# Patient Record
Sex: MALE | Race: BLACK OR AFRICAN AMERICAN | Employment: UNEMPLOYED | URBAN - METROPOLITAN AREA
[De-identification: names, ages, dates, MRNs, and addresses within clinical notes are randomized per-mention and may not be internally consistent; named-entity substitution may affect disease eponyms.]

---

## 2019-05-06 ENCOUNTER — OP HISTORICAL/CONVERTED ENCOUNTER (OUTPATIENT)
Dept: OTHER | Age: 49
End: 2019-05-06

## 2022-05-11 ENCOUNTER — HOSPITAL ENCOUNTER (OUTPATIENT)
Dept: GENERAL RADIOLOGY | Age: 52
Discharge: HOME | End: 2022-05-11
Payer: MEDICAID

## 2022-05-11 ENCOUNTER — TRANSCRIBE ORDER (OUTPATIENT)
Dept: REGISTRATION | Age: 52
End: 2022-05-11

## 2022-05-11 DIAGNOSIS — R05.9 COUGH: ICD-10-CM

## 2022-05-11 DIAGNOSIS — R05.9 COUGH: Primary | ICD-10-CM

## 2022-05-11 PROCEDURE — 71046 X-RAY EXAM CHEST 2 VIEWS: CPT

## 2022-07-12 ENCOUNTER — OFFICE VISIT (OUTPATIENT)
Dept: FAMILY MEDICINE CLINIC | Age: 52
End: 2022-07-12
Payer: MEDICARE

## 2022-07-12 VITALS
HEIGHT: 71 IN | OXYGEN SATURATION: 98 % | WEIGHT: 231.2 LBS | RESPIRATION RATE: 20 BRPM | BODY MASS INDEX: 32.37 KG/M2 | HEART RATE: 91 BPM | TEMPERATURE: 97.6 F | DIASTOLIC BLOOD PRESSURE: 82 MMHG | SYSTOLIC BLOOD PRESSURE: 126 MMHG

## 2022-07-12 DIAGNOSIS — E78.00 HYPERCHOLESTEROLEMIA: ICD-10-CM

## 2022-07-12 DIAGNOSIS — N48.1 BALANITIS: ICD-10-CM

## 2022-07-12 DIAGNOSIS — R35.0 INCREASED URINARY FREQUENCY: ICD-10-CM

## 2022-07-12 DIAGNOSIS — I10 ESSENTIAL HYPERTENSION: Primary | ICD-10-CM

## 2022-07-12 DIAGNOSIS — E11.9 TYPE 2 DIABETES MELLITUS WITHOUT COMPLICATION, WITHOUT LONG-TERM CURRENT USE OF INSULIN (HCC): ICD-10-CM

## 2022-07-12 DIAGNOSIS — E03.9 HYPOTHYROIDISM, UNSPECIFIED TYPE: ICD-10-CM

## 2022-07-12 PROBLEM — E66.9 OBESITY (BMI 30.0-34.9): Status: ACTIVE | Noted: 2022-07-12

## 2022-07-12 PROBLEM — E66.811 OBESITY (BMI 30.0-34.9): Status: ACTIVE | Noted: 2022-07-12

## 2022-07-12 PROBLEM — E55.9 VITAMIN D DEFICIENCY: Status: ACTIVE | Noted: 2022-07-12

## 2022-07-12 LAB
BILIRUB UR QL STRIP: NEGATIVE
GLUCOSE UR-MCNC: NORMAL MG/DL
KETONES P FAST UR STRIP-MCNC: NEGATIVE MG/DL
PH UR STRIP: 6 [PH] (ref 4.6–8)
PROT UR QL STRIP: NEGATIVE
SP GR UR STRIP: 1.03 (ref 1–1.03)
UA UROBILINOGEN AMB POC: NORMAL (ref 0.2–1)
URINALYSIS CLARITY POC: CLEAR
URINALYSIS COLOR POC: YELLOW
URINE BLOOD POC: NEGATIVE
URINE LEUKOCYTES POC: NEGATIVE
URINE NITRITES POC: NEGATIVE

## 2022-07-12 PROCEDURE — 81001 URINALYSIS AUTO W/SCOPE: CPT | Performed by: STUDENT IN AN ORGANIZED HEALTH CARE EDUCATION/TRAINING PROGRAM

## 2022-07-12 PROCEDURE — 99204 OFFICE O/P NEW MOD 45 MIN: CPT | Performed by: STUDENT IN AN ORGANIZED HEALTH CARE EDUCATION/TRAINING PROGRAM

## 2022-07-12 RX ORDER — INSULIN PUMP SYRINGE, 3 ML
EACH MISCELLANEOUS
Qty: 1 KIT | Refills: 0 | Status: SHIPPED | OUTPATIENT
Start: 2022-07-12

## 2022-07-12 RX ORDER — AMLODIPINE BESYLATE 10 MG/1
10 TABLET ORAL DAILY
COMMUNITY
End: 2022-10-17

## 2022-07-12 RX ORDER — CIPROFLOXACIN 500 MG/1
TABLET ORAL
COMMUNITY
Start: 2022-07-06 | End: 2022-07-12 | Stop reason: ALTCHOICE

## 2022-07-12 RX ORDER — ATORVASTATIN CALCIUM 20 MG/1
20 TABLET, FILM COATED ORAL DAILY
COMMUNITY
Start: 2022-06-15 | End: 2022-10-17

## 2022-07-12 RX ORDER — AMLODIPINE BESYLATE AND BENAZEPRIL HYDROCHLORIDE 10; 40 MG/1; MG/1
CAPSULE ORAL
COMMUNITY
Start: 2022-06-15 | End: 2022-07-12 | Stop reason: ALTCHOICE

## 2022-07-12 RX ORDER — LANCETS
EACH MISCELLANEOUS
Qty: 100 EACH | Refills: 2 | Status: SHIPPED | OUTPATIENT
Start: 2022-07-12

## 2022-07-12 RX ORDER — HYDRALAZINE HYDROCHLORIDE 50 MG/1
TABLET, FILM COATED ORAL
COMMUNITY
Start: 2022-06-22 | End: 2022-10-17

## 2022-07-12 RX ORDER — METRONIDAZOLE 500 MG/1
TABLET ORAL
COMMUNITY
Start: 2022-06-30 | End: 2022-07-12 | Stop reason: ALTCHOICE

## 2022-07-12 RX ORDER — ERGOCALCIFEROL 1.25 MG/1
50000 CAPSULE ORAL
COMMUNITY
Start: 2022-06-15 | End: 2022-10-17

## 2022-07-12 RX ORDER — SYRINGE-NEEDLE,INSULIN,0.5 ML 28GX1/2"
SYRINGE, EMPTY DISPOSABLE MISCELLANEOUS
COMMUNITY
Start: 2022-05-10 | End: 2022-07-12 | Stop reason: ALTCHOICE

## 2022-07-12 RX ORDER — CALCIUM CITRATE/VITAMIN D3 200MG-6.25
TABLET ORAL
COMMUNITY
Start: 2022-06-15 | End: 2022-07-12 | Stop reason: ALTCHOICE

## 2022-07-12 RX ORDER — FLUCONAZOLE 150 MG/1
150 TABLET ORAL DAILY
Qty: 1 TABLET | Refills: 0 | Status: SHIPPED | OUTPATIENT
Start: 2022-07-12 | End: 2022-07-13

## 2022-07-12 NOTE — PROGRESS NOTES
Patient stated name &     Chief Complaint   Patient presents with    New Patient     UTI     Frequent urination     Tip of penis raw and tender    Dr Jl Ivy - Former PCP dx'd uti      Had for 1 month   Tried Cipro 500 mg for 5 days        Health Maintenance Due   Topic    Hepatitis C Screening     Depression Screen     COVID-19 Vaccine (1)    DTaP/Tdap/Td series (1 - Tdap)    Lipid Screen     Colorectal Cancer Screening Combo     Shingrix Vaccine Age 50> (1 of 2)       Wt Readings from Last 3 Encounters:   22 231 lb 3.2 oz (104.9 kg)     Temp Readings from Last 3 Encounters:   22 97.6 °F (36.4 °C) (Temporal)     BP Readings from Last 3 Encounters:   22 (!) 181/97     Pulse Readings from Last 3 Encounters:   22 91         Learning Assessment:  :     No flowsheet data found. Depression Screening:  :     3 most recent PHQ Screens 2022   Little interest or pleasure in doing things Not at all   Feeling down, depressed, irritable, or hopeless Not at all   Total Score PHQ 2 0       Fall Risk Assessment:  :     No flowsheet data found. Abuse Screening:  :     Abuse Screening Questionnaire 2022   Do you ever feel afraid of your partner? N   Are you in a relationship with someone who physically or mentally threatens you? N   Is it safe for you to go home? Y       Coordination of Care Questionnaire:  :     1) Have you been to an emergency room, urgent care clinic since your last visit? no   Hospitalized since your last visit? no             2) Have you seen or consulted any other health care providers outside of [address] [place] [address] since your last visit? no  (Include any pap smears or colon screenings in this section.)    3) Do you have an Advance Directive on file?  no  Are you interested in receiving information about Advance Directives? no    Patient is accompanied by self I have received verbal consent from Laureen Clark to discuss any/all medical information while they are present in the room.

## 2022-07-12 NOTE — PROGRESS NOTES
Assessment/Plan:     Diagnoses and all orders for this visit:    1. Essential hypertension  -     MICROALBUMIN, UR, RAND W/ MICROALB/CREAT RATIO; Future  -     METABOLIC PANEL, COMPREHENSIVE; Future  -     CBC WITH AUTOMATED DIFF; Future  -Chronic. Currently controlled in the office with a BP of 126/82. -Will request records from previous PCP.  -Had a cough secondary to ACE inhibitor  -Will continue on hydralazine and amlodipine 10 mg daily  -If needed can consider adding losartan in the future    2. Type 2 diabetes mellitus without complication, without long-term current use of insulin ([facility])  -     MICROALBUMIN, UR, RAND W/ MICROALB/CREAT RATIO; Future  -     METABOLIC PANEL, COMPREHENSIVE; Future  -     CBC WITH AUTOMATED DIFF; Future  -     HEMOGLOBIN A1C WITH EAG; Future  -     Blood-Glucose Meter monitoring kit; Check fasting glucose daily. Can substitute brand covered by insurance  -     lancets misc; Check fasting glucose daily. Can substitute brand covered by insurance  -     glucose blood VI test strips (ASCENSIA AUTODISC VI, ONE TOUCH ULTRA TEST VI) strip; Check fasting glucose daily. Can substitute brand covered by insurance  -Chronic. Uncertain of the control.  -His increased urinary frequency may be due to uncontrolled diabetes therefore will check lab work today including an A1c  -Patient is up-to-date on his annual eye exams.  -Continue on glipizide 10 mg twice daily for now. Encouraged patient to check fasting glucoses and keep a log  -Encouraged dietary and lifestyle modifications  -Can consider adding a GLP-1 if needed pending lab results  -Follow-up in 1 month for reevaluation    3. Hypothyroidism, unspecified type  -     TSH 3RD GENERATION; Future  -Previous history of hypothyroidism but has not required levothyroxine for couple of years per patient  -Will repeat a TSH today. 4. Increased urinary frequency  -     AMB POC URINALYSIS DIP STICK AUTO W/ MICRO  -     CULTURE, URINE;  Future  -Has had increased urinary frequency and previously treated for UTI. -UA in office today unremarkable other than 2+ glucose. Suspect his diabetes may be uncontrolled and contributing.  -Will send urine for culture to rule out infection. 5. Balanitis  -     fluconazole (DIFLUCAN) 150 mg tablet; Take 1 Tablet by mouth daily for 1 day. FDA advises cautious prescribing of oral fluconazole in pregnancy.  -Physical exam findings consistent with balanitis due to yeast infection  -Likely secondary to underlying diabetes and recent antibiotic use  -Given extent of the yeast infection will prescribe one-time dose fluconazole  -Patient to follow-up should symptoms fail to improve. Could consider a topical antifungal if needed. 6. Hypercholesterolemia  -     LIPID PANEL; Future  -Chronic. Will check a lipid panel today.  -Continue on atorvastatin daily. Follow-up and Dispositions    · Return in about 4 weeks (around 8/9/2022), or if symptoms worsen or fail to improve. Discussed expected course/resolution/complications of diagnosis in detail with patient. Medication risks/benefits/costs/interactions/alternatives discussed with patient. Pt expressed understanding with the diagnosis and plan      Subjective:      Keri Fields is a 45 y.o. male who presents for had concerns including New Patient (UTI     Frequent urination     Tip of penis raw and tender    Dr Bennie Fischer - Former PCP dx'd uti      Had for 1 month   Tried Cipro 500 mg for 5 days). Current Outpatient Medications   Medication Sig Dispense Refill    atorvastatin (LIPITOR) 20 mg tablet Take 20 mg by mouth daily.  ergocalciferol (ERGOCALCIFEROL) 1,250 mcg (50,000 unit) capsule Take 50,000 Units by mouth every seven (7) days. TAKES EVERY MONDAY      Blood-Glucose Meter monitoring kit Check fasting glucose daily. Can substitute brand covered by insurance 1 Kit 0    lancets misc Check fasting glucose daily.  Can substitute brand covered by insurance 100 Each 2    glucose blood VI test strips (ASCENSIA AUTODISC VI, ONE TOUCH ULTRA TEST VI) strip Check fasting glucose daily. Can substitute brand covered by insurance 100 Strip 2    fluconazole (DIFLUCAN) 150 mg tablet Take 1 Tablet by mouth daily for 1 day. FDA advises cautious prescribing of oral fluconazole in pregnancy. 1 Tablet 0    amLODIPine (NORVASC) 10 mg tablet Take 10 mg by mouth daily.  glipiZIDE (GLUCOTROL) 10 mg tablet Take 10 mg by mouth two (2) times a day.  hydrALAZINE (APRESOLINE) 50 mg tablet  (Patient not taking: Reported on 7/12/2022)         No Known Allergies  Past Medical History:   Diagnosis Date    Diabetes ([facility] [address]     Hypercholesterolemia     Hypertension      History reviewed. No pertinent surgical history. Family History   Problem Relation Age of Onset    Diabetes Father      Social History     Socioeconomic History    Marital status: SINGLE     Spouse name: Not on file    Number of children: Not on file    Years of education: Not on file    Highest education level: Not on file   Occupational History    Not on file   Tobacco Use    Smoking status: Never Smoker    Smokeless tobacco: Never Used   Vaping Use    Vaping Use: Never used   Substance and Sexual Activity    Alcohol use: Never    Drug use: Never    Sexual activity: Not on file   Other Topics Concern    Not on file   Social History Narrative    Not on file     Social Determinants of Health     Financial Resource Strain:     Difficulty of Paying Living Expenses: Not on file   Food Insecurity:     Worried About [address] in the Last Year: Not on file    Craig of Food in the Last Year: Not on file   Transportation Needs:     Lack of Transportation (Medical): Not on file    Lack of Transportation (Non-Medical):  Not on file   Physical Activity: Sufficiently Active    Days of Exercise per Week: 3 days    Minutes of Exercise per Session: 60 min   Stress:     Feeling of Stress : Not on file   Social Connections:     Frequency of Communication with Friends and Family: Not on file    Frequency of Social Gatherings with Friends and Family: Not on file    Attends Orthodox Services: Not on file    Active Member of Clubs or Organizations: Not on file    Attends Club or Organization Meetings: Not on file    Marital Status: Not on file   Intimate Partner Violence: Not At Risk    Fear of Current or Ex-Partner: No    Emotionally Abused: No    Physically Abused: No    Sexually Abused: No   Housing Stability:     Unable to Pay for Housing in the Last Year: Not on file    Number of [place] in the Last Year: Not on file    Unstable Housing in the Last Year: Not on file       Patient is a 46 yo male who presents to the office today to establish care and also for concerns of a UTI. Patient states he was seen by his previous PCP office in [facility] last week and diagnosed with a UTI. He states he just completed 5 days of ciprofloxacin. He states since completing the antibiotic he still continues to have frequent urination. He denies any significant dysuria but does state he has some discomfort since having redness and irritation at the end of his penis over the past week or 2. Patient also states he has a history of hypertension and is currently on amlodipine 10 mg daily and hydralazine. He was previously on benazepril but this was discontinued due to cough. He feels like the hydralazine may not be working well. He has not checked his blood pressure regularly at home. He is currently going through some minor stress due to the death of the family that could be contributing. Patient states he does have a history of elevated cholesterol and is currently on atorvastatin 20 mg daily. He also has a history of previous hypothyroidism and was on levothyroxine but states he has been off of this for about a year or two as it is no longer needed.     Finally, patient has a history of type 2 diabetes and is currently on glipizide 10 mg twice daily. He has not been able to recently check his fasting glucoses due to his meter not properly working. He does state he has a poor diet and he is working on cutting back eating at night and eating foods such as donuts. He denies any hypoglycemic episodes. He does state he recently had a diabetic eye exam that was normal per patient. ROS:   Review of Systems   Constitutional: Negative for chills and fever. HENT: Negative for congestion. Eyes: Negative for blurred vision. Respiratory: Negative for cough and shortness of breath. Cardiovascular: Negative for chest pain, palpitations and leg swelling. Gastrointestinal: Negative for abdominal pain, nausea and vomiting. Genitourinary: Positive for frequency. Negative for dysuria. Neurological: Negative for dizziness, tingling, weakness and headaches. Objective:     Visit Vitals  /82 (BP 1 Location: Left arm, BP Patient Position: Sitting, BP Cuff Size: Adult)   Pulse 91   Temp 97.6 °F (36.4 °C) (Temporal)   Resp 20   Ht 5' 11\" (1.803 m)   Wt 231 lb 3.2 oz (104.9 kg)   SpO2 98%   BMI 32.25 kg/m²         Vitals and Nurse Documentation reviewed. Physical Exam  Constitutional:       General: He is not in acute distress. Appearance: Normal appearance. He is obese. He is not toxic-appearing. HENT:      Head: Normocephalic and atraumatic. Eyes:      Conjunctiva/sclera: Conjunctivae normal.   Cardiovascular:      Rate and Rhythm: Normal rate and regular rhythm. Pulses: Normal pulses. Heart sounds: Normal heart sounds. No murmur heard. No gallop. Pulmonary:      Effort: Pulmonary effort is normal. No respiratory distress. Breath sounds: Normal breath sounds. No wheezing or rhonchi. Abdominal:      General: Bowel sounds are normal. There is no distension. Palpations: Abdomen is soft. Tenderness: There is no abdominal tenderness. There is no guarding. Genitourinary:     Comments: Penis: Retracted foreskin erythematous with white curd-like exudate noted on foreskin consistent with yeast/balanitis. Patient is uncircumcised. Unable to fully visualize the glans penis (nurse chaperone present for exam)  Musculoskeletal:      Right lower leg: No edema. Left lower leg: No edema. Neurological:      Mental Status: He is alert and oriented to person, place, and time.       Gait: Gait normal.         Results for orders placed or performed in visit on 07/12/22   AMB POC URINALYSIS DIP STICK AUTO W/ MICRO   Result Value Ref Range    Color (UA POC) Yellow     Clarity (UA POC) Clear     Glucose (UA POC) 2+ Negative    Bilirubin (UA POC) Negative Negative    Ketones (UA POC) Negative Negative    Specific gravity (UA POC) 1.030 1.001 - 1.035    Blood (UA POC) Negative Negative    pH (UA POC) 6.0 4.6 - 8.0    Protein (UA POC) Negative Negative    Urobilinogen (UA POC) 0.2 mg/dL 0.2 - 1    Nitrites (UA POC) Negative Negative    Leukocyte esterase (UA POC) Negative Negative

## 2022-07-13 LAB
ALBUMIN SERPL-MCNC: 4.1 G/DL (ref 3.8–4.9)
ALBUMIN/CREAT UR: 15 MG/G CREAT (ref 0–29)
ALBUMIN/GLOB SERPL: 1.5 {RATIO} (ref 1.2–2.2)
ALP SERPL-CCNC: 82 IU/L (ref 44–121)
ALT SERPL-CCNC: 24 IU/L (ref 0–44)
AST SERPL-CCNC: 16 IU/L (ref 0–40)
BASOPHILS # BLD AUTO: 0 X10E3/UL (ref 0–0.2)
BASOPHILS NFR BLD AUTO: 1 %
BILIRUB SERPL-MCNC: <0.2 MG/DL (ref 0–1.2)
BUN SERPL-MCNC: 16 MG/DL (ref 6–24)
BUN/CREAT SERPL: 18 (ref 9–20)
CALCIUM SERPL-MCNC: 9.7 MG/DL (ref 8.7–10.2)
CHLORIDE SERPL-SCNC: 101 MMOL/L (ref 96–106)
CHOLEST SERPL-MCNC: 194 MG/DL (ref 100–199)
CO2 SERPL-SCNC: 19 MMOL/L (ref 20–29)
CREAT SERPL-MCNC: 0.9 MG/DL (ref 0.76–1.27)
CREAT UR-MCNC: 33.2 MG/DL
EGFR: 101 ML/MIN/1.73
EOSINOPHIL # BLD AUTO: 0.2 X10E3/UL (ref 0–0.4)
EOSINOPHIL NFR BLD AUTO: 3 %
ERYTHROCYTE [DISTWIDTH] IN BLOOD BY AUTOMATED COUNT: 12.9 % (ref 11.6–15.4)
EST. AVERAGE GLUCOSE BLD GHB EST-MCNC: 269 MG/DL
GLOBULIN SER CALC-MCNC: 2.8 G/DL (ref 1.5–4.5)
GLUCOSE SERPL-MCNC: 400 MG/DL (ref 65–99)
HBA1C MFR BLD: 11 % (ref 4.8–5.6)
HCT VFR BLD AUTO: 44.7 % (ref 37.5–51)
HDLC SERPL-MCNC: 42 MG/DL
HGB BLD-MCNC: 14.5 G/DL (ref 13–17.7)
IMM GRANULOCYTES # BLD AUTO: 0 X10E3/UL (ref 0–0.1)
IMM GRANULOCYTES NFR BLD AUTO: 0 %
LDLC SERPL CALC-MCNC: 116 MG/DL (ref 0–99)
LYMPHOCYTES # BLD AUTO: 1.2 X10E3/UL (ref 0.7–3.1)
LYMPHOCYTES NFR BLD AUTO: 21 %
MCH RBC QN AUTO: 29.8 PG (ref 26.6–33)
MCHC RBC AUTO-ENTMCNC: 32.4 G/DL (ref 31.5–35.7)
MCV RBC AUTO: 92 FL (ref 79–97)
MICROALBUMIN UR-MCNC: 5 UG/ML
MONOCYTES # BLD AUTO: 0.7 X10E3/UL (ref 0.1–0.9)
MONOCYTES NFR BLD AUTO: 12 %
NEUTROPHILS # BLD AUTO: 3.8 X10E3/UL (ref 1.4–7)
NEUTROPHILS NFR BLD AUTO: 63 %
PLATELET # BLD AUTO: 296 X10E3/UL (ref 150–450)
POTASSIUM SERPL-SCNC: 4.5 MMOL/L (ref 3.5–5.2)
PROT SERPL-MCNC: 6.9 G/DL (ref 6–8.5)
RBC # BLD AUTO: 4.87 X10E6/UL (ref 4.14–5.8)
SODIUM SERPL-SCNC: 136 MMOL/L (ref 134–144)
TRIGL SERPL-MCNC: 206 MG/DL (ref 0–149)
TSH SERPL DL<=0.005 MIU/L-ACNC: 2.22 UIU/ML (ref 0.45–4.5)
VLDLC SERPL CALC-MCNC: 36 MG/DL (ref 5–40)
WBC # BLD AUTO: 6 X10E3/UL (ref 3.4–10.8)

## 2022-07-14 NOTE — PROGRESS NOTES
I tried to call patient with no answer. Left voice message. Please call patient to notify him his diabetes is very uncontrolled with an A1C of 11.0. His glucose was 400 on the BMP therefore please advise him to check his sugar and let me know what it is. He needs to schedule a follow-up appointment soon to discuss his diabetes and cholesterol.

## 2022-07-15 LAB — BACTERIA UR CULT: ABNORMAL

## 2022-07-18 NOTE — PROGRESS NOTES
Two patient Identification confirmed. Patient notified. Patient verbalized understanding. Patient will keep appointment for 8/9/2022. Patient reports reason his labs are so abnormal is due to death in the family   Patient states he stopped taking care of himself. He will discuss at next appointment.

## 2022-08-15 ENCOUNTER — APPOINTMENT (OUTPATIENT)
Dept: GENERAL RADIOLOGY | Age: 52
End: 2022-08-15
Attending: EMERGENCY MEDICINE
Payer: MEDICAID

## 2022-08-15 ENCOUNTER — HOSPITAL ENCOUNTER (EMERGENCY)
Age: 52
Discharge: HOME | End: 2022-08-15
Attending: EMERGENCY MEDICINE
Payer: MEDICAID

## 2022-08-15 VITALS
HEART RATE: 83 BPM | WEIGHT: 218 LBS | TEMPERATURE: 98.5 F | DIASTOLIC BLOOD PRESSURE: 101 MMHG | SYSTOLIC BLOOD PRESSURE: 153 MMHG | OXYGEN SATURATION: 99 % | BODY MASS INDEX: 31.21 KG/M2 | RESPIRATION RATE: 18 BRPM | HEIGHT: 70 IN

## 2022-08-15 DIAGNOSIS — J20.9 ACUTE BRONCHITIS, UNSPECIFIED ORGANISM: ICD-10-CM

## 2022-08-15 DIAGNOSIS — I10 ESSENTIAL HYPERTENSION: Primary | ICD-10-CM

## 2022-08-15 LAB
ALBUMIN SERPL-MCNC: 3.4 G/DL (ref 3.5–5)
ALBUMIN/GLOB SERPL: 0.9 {RATIO} (ref 1.1–2.2)
ALP SERPL-CCNC: 70 U/L (ref 45–117)
ALT SERPL-CCNC: 34 U/L (ref 12–78)
ANION GAP SERPL CALC-SCNC: 6 MMOL/L (ref 5–15)
AST SERPL W P-5'-P-CCNC: 20 U/L (ref 15–37)
ATRIAL RATE: 73 BPM
BASOPHILS # BLD: 0 K/UL (ref 0–0.1)
BASOPHILS NFR BLD: 0 % (ref 0–1)
BILIRUB SERPL-MCNC: 0.4 MG/DL (ref 0.2–1)
BUN SERPL-MCNC: 7 MG/DL (ref 6–20)
BUN/CREAT SERPL: 9 (ref 12–20)
CA-I BLD-MCNC: 9.1 MG/DL (ref 8.5–10.1)
CALCULATED P AXIS, ECG09: 31 DEGREES
CALCULATED R AXIS, ECG10: -6 DEGREES
CALCULATED T AXIS, ECG11: 41 DEGREES
CHLORIDE SERPL-SCNC: 105 MMOL/L (ref 97–108)
CO2 SERPL-SCNC: 25 MMOL/L (ref 21–32)
COVID-19 RAPID TEST, COVR: NOT DETECTED
CREAT SERPL-MCNC: 0.79 MG/DL (ref 0.7–1.3)
DIAGNOSIS, 93000: NORMAL
DIFFERENTIAL METHOD BLD: NORMAL
EOSINOPHIL # BLD: 0.1 K/UL (ref 0–0.4)
EOSINOPHIL NFR BLD: 1 % (ref 0–7)
ERYTHROCYTE [DISTWIDTH] IN BLOOD BY AUTOMATED COUNT: 12.4 % (ref 11.5–14.5)
GLOBULIN SER CALC-MCNC: 4 G/DL (ref 2–4)
GLUCOSE BLD STRIP.AUTO-MCNC: 168 MG/DL (ref 65–117)
GLUCOSE SERPL-MCNC: 196 MG/DL (ref 65–100)
HCT VFR BLD AUTO: 42.8 % (ref 36.6–50.3)
HGB BLD-MCNC: 14.6 G/DL (ref 12.1–17)
IMM GRANULOCYTES # BLD AUTO: 0 K/UL (ref 0–0.04)
IMM GRANULOCYTES NFR BLD AUTO: 0 % (ref 0–0.5)
LYMPHOCYTES # BLD: 1.4 K/UL (ref 0.8–3.5)
LYMPHOCYTES NFR BLD: 29 % (ref 12–49)
MCH RBC QN AUTO: 29.9 PG (ref 26–34)
MCHC RBC AUTO-ENTMCNC: 34.1 G/DL (ref 30–36.5)
MCV RBC AUTO: 87.7 FL (ref 80–99)
MONOCYTES # BLD: 0.5 K/UL (ref 0–1)
MONOCYTES NFR BLD: 10 % (ref 5–13)
NEUTS SEG # BLD: 2.9 K/UL (ref 1.8–8)
NEUTS SEG NFR BLD: 60 % (ref 32–75)
NRBC # BLD: 0 K/UL (ref 0–0.01)
NRBC BLD-RTO: 0 PER 100 WBC
P-R INTERVAL, ECG05: 186 MS
PERFORMED BY, TECHID: ABNORMAL
PLATELET # BLD AUTO: 287 K/UL (ref 150–400)
PMV BLD AUTO: 9.6 FL (ref 8.9–12.9)
POTASSIUM SERPL-SCNC: 3.8 MMOL/L (ref 3.5–5.1)
PROT SERPL-MCNC: 7.4 G/DL (ref 6.4–8.2)
Q-T INTERVAL, ECG07: 390 MS
QRS DURATION, ECG06: 106 MS
QTC CALCULATION (BEZET), ECG08: 429 MS
RBC # BLD AUTO: 4.88 M/UL (ref 4.1–5.7)
SODIUM SERPL-SCNC: 136 MMOL/L (ref 136–145)
TROPONIN-HIGH SENSITIVITY: 14 NG/L (ref 0–76)
VENTRICULAR RATE, ECG03: 73 BPM
WBC # BLD AUTO: 4.8 K/UL (ref 4.1–11.1)

## 2022-08-15 PROCEDURE — 82962 GLUCOSE BLOOD TEST: CPT

## 2022-08-15 PROCEDURE — 36415 COLL VENOUS BLD VENIPUNCTURE: CPT

## 2022-08-15 PROCEDURE — 85025 COMPLETE CBC W/AUTO DIFF WBC: CPT

## 2022-08-15 PROCEDURE — 71045 X-RAY EXAM CHEST 1 VIEW: CPT

## 2022-08-15 PROCEDURE — 99285 EMERGENCY DEPT VISIT HI MDM: CPT

## 2022-08-15 PROCEDURE — 93005 ELECTROCARDIOGRAM TRACING: CPT

## 2022-08-15 PROCEDURE — 84484 ASSAY OF TROPONIN QUANT: CPT

## 2022-08-15 PROCEDURE — 80053 COMPREHEN METABOLIC PANEL: CPT

## 2022-08-15 PROCEDURE — 87635 SARS-COV-2 COVID-19 AMP PRB: CPT

## 2022-08-15 RX ORDER — ALBUTEROL SULFATE 90 UG/1
2 AEROSOL, METERED RESPIRATORY (INHALATION)
Qty: 6.7 G | Refills: 0 | Status: SHIPPED | OUTPATIENT
Start: 2022-08-15

## 2022-08-15 RX ORDER — BENZONATATE 100 MG/1
100 CAPSULE ORAL
Qty: 21 CAPSULE | Refills: 0 | Status: SHIPPED | OUTPATIENT
Start: 2022-08-15 | End: 2022-08-22

## 2022-08-15 RX ORDER — AMLODIPINE BESYLATE AND ATORVASTATIN CALCIUM 10; 40 MG/1; MG/1
1 TABLET, FILM COATED ORAL DAILY
Qty: 30 TABLET | Refills: 0 | Status: SHIPPED | OUTPATIENT
Start: 2022-08-15

## 2022-08-15 RX ORDER — ONDANSETRON 4 MG/1
4 TABLET, ORALLY DISINTEGRATING ORAL
Qty: 20 TABLET | Refills: 0 | Status: SHIPPED | OUTPATIENT
Start: 2022-08-15 | End: 2022-10-18

## 2022-08-15 NOTE — DISCHARGE INSTRUCTIONS
Thank you! Thank you for allowing me to care for you in the emergency department. It is my goal to provide you with excellent care. If you have not received excellent quality care, please ask to speak to the nurse manager. Please fill out the survey that will come to you by mail or email since we listen to your feedback! Below you will find a list of your tests from today's visit. Should you have any questions, please do not hesitate to call the emergency department. Labs  Recent Results (from the past 12 hour(s))   GLUCOSE, POC    Collection Time: 08/15/22  1:45 PM   Result Value Ref Range    Glucose (POC) 168 (H) 65 - 117 mg/dL    Performed by [facility]    EKG, 12 LEAD, INITIAL    Collection Time: 08/15/22  1:58 PM   Result Value Ref Range    Ventricular Rate 73 BPM    Atrial Rate 73 BPM    P-R Interval 186 ms    QRS Duration 106 ms    Q-T Interval 390 ms    QTC Calculation (Bezet) 429 ms    Calculated P Axis 31 degrees    Calculated R Axis -6 degrees    Calculated T Axis 41 degrees    Diagnosis       Normal sinus rhythm  Minimal voltage criteria for LVH, may be normal variant  Inferior infarct , age undetermined  Abnormal ECG  No previous ECGs available  Confirmed by Tim Marcano MD, [facility] ([id number]) on 8/15/2022 4:23:10 PM     CBC WITH AUTOMATED DIFF    Collection Time: 08/15/22  2:04 PM   Result Value Ref Range    WBC 4.8 4.1 - 11.1 K/uL    RBC 4.88 4.10 - 5.70 M/uL    HGB 14.6 12.1 - 17.0 g/dL    HCT 42.8 36.6 - 50.3 %    MCV 87.7 80.0 - 99.0 FL    MCH 29.9 26.0 - 34.0 PG    MCHC 34.1 30.0 - 36.5 g/dL    RDW 12.4 11.5 - 14.5 %    PLATELET 584 612 - 454 K/uL    MPV 9.6 8.9 - 12.9 FL    NRBC 0.0 0.0  WBC    ABSOLUTE NRBC 0.00 0.00 - 0.01 K/uL    NEUTROPHILS 60 32 - 75 %    LYMPHOCYTES 29 12 - 49 %    MONOCYTES 10 5 - 13 %    EOSINOPHILS 1 0 - 7 %    BASOPHILS 0 0 - 1 %    IMMATURE GRANULOCYTES 0 0 - 0.5 %    ABS. NEUTROPHILS 2.9 1.8 - 8.0 K/UL    ABS. LYMPHOCYTES 1.4 0.8 - 3.5 K/UL    ABS.  MONOCYTES 0.5 0.0 - 1.0 K/UL    ABS. EOSINOPHILS 0.1 0.0 - 0.4 K/UL    ABS. BASOPHILS 0.0 0.0 - 0.1 K/UL    ABS. IMM. GRANS. 0.0 0.00 - 0.04 K/UL    DF AUTOMATED     METABOLIC PANEL, COMPREHENSIVE    Collection Time: 08/15/22  2:04 PM   Result Value Ref Range    Sodium 136 136 - 145 mmol/L    Potassium 3.8 3.5 - 5.1 mmol/L    Chloride 105 97 - 108 mmol/L    CO2 25 21 - 32 mmol/L    Anion gap 6 5 - 15 mmol/L    Glucose 196 (H) 65 - 100 mg/dL    BUN 7 6 - 20 mg/dL    Creatinine 0.79 0.70 - 1.30 mg/dL    BUN/Creatinine ratio 9 (L) 12 - 20      GFR est AA >60 >60 ml/min/1.73m2    GFR est non-AA >60 >60 ml/min/1.73m2    Calcium 9.1 8.5 - 10.1 mg/dL    Bilirubin, total 0.4 0.2 - 1.0 mg/dL    AST (SGOT) 20 15 - 37 U/L    ALT (SGPT) 34 12 - 78 U/L    Alk. phosphatase 70 45 - 117 U/L    Protein, total 7.4 6.4 - 8.2 g/dL    Albumin 3.4 (L) 3.5 - 5.0 g/dL    Globulin 4.0 2.0 - 4.0 g/dL    A-G Ratio 0.9 (L) 1.1 - 2.2     TROPONIN-HIGH SENSITIVITY    Collection Time: 08/15/22  2:04 PM   Result Value Ref Range    Troponin-High Sensitivity 14 0 - 76 ng/L       Radiologic Studies  XR CHEST PORT   Final Result   No acute process. CT Results  (Last 48 hours)      None          CXR Results  (Last 48 hours)                 08/15/22 1514  XR CHEST PORT Final result    Impression:  No acute process. Narrative:  INDICATION: r/o cardiac issues       EXAM:  AP CHEST RADIOGRAPH       COMPARISON: 5/11/22       FINDINGS:       AP portable view of the chest demonstrates a normal cardiomediastinal   silhouette. There is no edema, effusion, consolidation, or pneumothorax. The   osseous structures are unremarkable.                 ------------------------------------------------------------------------------------------------------------  The exam and treatment you received in the Emergency Department were for an urgent problem and are not intended as complete care.  It is important that you follow-up with a doctor, nurse practitioner, or physician assistant to:  (1) confirm your diagnosis,  (2) re-evaluation of changes in your illness and treatment, and  (3) for ongoing care. Please take your discharge instructions with you when you go to your follow-up appointment. If you have any problem arranging a follow-up appointment, contact the Emergency Department. If your symptoms become worse or you do not improve as expected and you are unable to reach your health care provider, please return to the Emergency Department. We are available 24 hours a day. If a prescription has been provided, please have it filled as soon as possible to prevent a delay in treatment. If you have any questions or reservations about taking the medication due to side effects or interactions with other medications, please call your primary care provider or contact the ER.

## 2022-08-15 NOTE — Clinical Note
[facility] EMERGENCY DEPT  [address] [place] [address] [id number] [phone]    Work/School Note    Date: 8/15/2022    To Whom It May concern:    Nallely Wu was seen and treated today in the emergency room by the following provider(s):  Attending Provider: Charo North MD.      Nallely Wu is excused from work/school on 8/15/2022 through 8/17/2022. He is medically clear to return to work/school on 8/18/2022.          Sincerely,          Adama Eid MD

## 2022-08-15 NOTE — ED TRIAGE NOTES
[facility] 15 pt pt stated that he has been having an elevated blood pressure and uncontrolled diabetes; also has been having a cough for the last 2 weeks; pt is out of all medications for HTN and diabetes

## 2022-08-15 NOTE — ED PROVIDER NOTES
EMERGENCY DEPARTMENT HISTORY AND PHYSICAL EXAM      Date: 8/15/2022  Patient Name: Dawn Acosta    History of Presenting Illness     Chief Complaint   Patient presents with    Hypertension       History Provided By: Patient    HPI: Dawn Acosta, 45 y.o. male presents to the ED with CC of 2 weeks history of cough and concern for uncontrolled diabetes. Patient reports the cough is nonproductive. Patient reports he has been out of his antihypertensives and medicines for diabetes as he is transitioning to a new primary care provider. Patient denies any other associated symptoms or complaints. Patient denies SOB, chest pain, or any neurological symptoms. There are no other complaints, changes, or physical findings at this time. Past History     Past Medical History:  Past Medical History:   Diagnosis Date    Diabetes ([facility] [address]     Hypercholesterolemia     Hypertension        Allergies:  No Known Allergies    Review of Systems   Vital signs and nursing notes reviewed  Review of Systems  Review of Systems   Constitutional: Negative for chills and fever. HENT: Negative for sinus pressure and sinus pain. Eyes: Negative for photophobia and redness. Respiratory: Negative for shortness of breath and wheezing. Cardiovascular: Negative for chest pain and palpitations. Gastrointestinal: Negative for abdominal pain and nausea. Genitourinary: Negative for flank pain and hematuria. Musculoskeletal: Negative for arthralgias and gait problem. Skin: Negative for color change and pallor. Neurological: Negative for dizziness and weakness. Physical Exam   Visit Vitals  BP (!) 153/101 (BP 1 Location: Left upper arm, BP Patient Position: At rest)   Pulse 83   Temp 98.5 °F (36.9 °C)   Resp 18   Ht 5' 10\" (1.778 m)   Wt 98.9 kg (218 lb)   SpO2 99%   BMI 31.28 kg/m²     CONSTITUTIONAL: Alert, in no distress. Appears stated age. HEAD:  Normocephalic, atraumatic  EYES: EOM intact.   No conjunctival injection or scleral icterus  Neck:  Supple. No meningismus  RESP: Normal with no work of breathing, speaking in full sentences. CV: Well perfused. NEURO: Alert with normal mentation, moving extremities spontaneously  PSYCH: Normal mood, normal affect      Medical Decision Making   Patient presents for COVID 19 testing with normal oxygen saturation and mild URI symptoms or COVID 19 exposure. COVID 19 testing was conducted. The patient was given quarantine/isolation recommendations and agrees with the plan to be discharged home. They were provided instructions to return for difficulty breathing, chest pain, altered mentation, or any other new or worsening symptoms. ED Course:   Initial assessment performed. The patients presenting problems have been discussed, and they are in agreement with the care plan formulated and outlined with them. I have encouraged them to ask questions as they arise throughout their visit. Critical Care Time: None    Disposition:  DISCHARGE NOTE:  The pt is ready for discharge. The pt's signs, symptoms, diagnosis, and discharge instructions have been discussed and pt has conveyed their understanding. The pt is to follow up as recommended or return to ER should their symptoms worsen. Plan has been discussed and pt is in agreement. PLAN:  1. Current Discharge Medication List        START taking these medications    Details   amLODIPine-atorvastatin (CADUET) 10-40 mg per tablet Take 1 Tablet by mouth in the morning. Qty: 30 Tablet, Refills: 0  Start date: 8/15/2022      albuterol (PROVENTIL HFA, VENTOLIN HFA, PROAIR HFA) 90 mcg/actuation inhaler Take 2 Puffs by inhalation every four (4) hours as needed for Cough. Qty: 6.7 g, Refills: 0  Start date: 8/15/2022      ondansetron (ZOFRAN ODT) 4 mg disintegrating tablet Take 1 Tablet by mouth every eight (8) hours as needed for Nausea or Vomiting.   Qty: 20 Tablet, Refills: 0  Start date: 8/15/2022      benzonatate (Tessalon Perles) 100 mg capsule Take 1 Capsule by mouth three (3) times daily as needed for Cough for up to 7 days. Qty: 21 Capsule, Refills: 0  Start date: 8/15/2022, End date: 8/22/2022           2. Follow-up Information       Follow up With Specialties Details Why [address] [place] [address] [address]    [address] [place] [address]  [phone]          3. COVID Testing results will be called once available if positive. Patient should utilize [employer] to access results. 4. Take Tylenol or Ibuprofen as needed  5. Drink plenty of fluids  6. Return to ED if worse especially if any shortness of breath, chest pain or altered mentation. Diagnosis     Clinical Impression:   1. Essential hypertension    2. Acute bronchitis, unspecified organism        Please note that this dictation was completed with [employer], the computer voice recognition software. Quite often unanticipated grammatical, syntax, homophones, and other interpretive errors are inadvertently transcribed by the computer software. Please disregards these errors. Please excuse any errors that have escaped final proofreading.

## 2022-10-17 ENCOUNTER — OFFICE VISIT (OUTPATIENT)
Dept: FAMILY MEDICINE CLINIC | Age: 52
End: 2022-10-17
Payer: MEDICAID

## 2022-10-17 VITALS
BODY MASS INDEX: 31.78 KG/M2 | HEART RATE: 107 BPM | RESPIRATION RATE: 18 BRPM | DIASTOLIC BLOOD PRESSURE: 85 MMHG | HEIGHT: 70 IN | WEIGHT: 222 LBS | TEMPERATURE: 97.7 F | SYSTOLIC BLOOD PRESSURE: 122 MMHG | OXYGEN SATURATION: 97 %

## 2022-10-17 DIAGNOSIS — R39.198 DIFFICULTY URINATING: ICD-10-CM

## 2022-10-17 DIAGNOSIS — N30.90 BLADDER INFECTION: ICD-10-CM

## 2022-10-17 DIAGNOSIS — N48.1 BALANITIS: ICD-10-CM

## 2022-10-17 DIAGNOSIS — E11.9 TYPE 2 DIABETES MELLITUS WITHOUT COMPLICATION, WITHOUT LONG-TERM CURRENT USE OF INSULIN (HCC): Primary | ICD-10-CM

## 2022-10-17 DIAGNOSIS — R05.3 CHRONIC COUGH: ICD-10-CM

## 2022-10-17 DIAGNOSIS — R35.0 INCREASED URINARY FREQUENCY: ICD-10-CM

## 2022-10-17 DIAGNOSIS — I10 ESSENTIAL HYPERTENSION: ICD-10-CM

## 2022-10-17 LAB
BILIRUB UR QL STRIP: NORMAL
GLUCOSE UR-MCNC: NORMAL MG/DL
KETONES P FAST UR STRIP-MCNC: NEGATIVE MG/DL
PH UR STRIP: 5.5 [PH] (ref 4.6–8)
PROT UR QL STRIP: NORMAL
SP GR UR STRIP: 1.03 (ref 1–1.03)
UA UROBILINOGEN AMB POC: NORMAL (ref 0.2–1)
URINALYSIS CLARITY POC: NORMAL
URINALYSIS COLOR POC: NORMAL
URINE BLOOD POC: NEGATIVE
URINE LEUKOCYTES POC: NEGATIVE
URINE NITRITES POC: NEGATIVE

## 2022-10-17 PROCEDURE — 81001 URINALYSIS AUTO W/SCOPE: CPT | Performed by: STUDENT IN AN ORGANIZED HEALTH CARE EDUCATION/TRAINING PROGRAM

## 2022-10-17 PROCEDURE — 3046F HEMOGLOBIN A1C LEVEL >9.0%: CPT | Performed by: STUDENT IN AN ORGANIZED HEALTH CARE EDUCATION/TRAINING PROGRAM

## 2022-10-17 PROCEDURE — 99214 OFFICE O/P EST MOD 30 MIN: CPT | Performed by: STUDENT IN AN ORGANIZED HEALTH CARE EDUCATION/TRAINING PROGRAM

## 2022-10-17 RX ORDER — OMEPRAZOLE 20 MG/1
20 CAPSULE, DELAYED RELEASE ORAL DAILY
Qty: 90 CAPSULE | Refills: 0 | Status: SHIPPED | OUTPATIENT
Start: 2022-10-17

## 2022-10-17 RX ORDER — INSULIN GLARGINE 100 [IU]/ML
10 INJECTION, SOLUTION SUBCUTANEOUS DAILY
Qty: 2 PEN | Refills: 1 | Status: SHIPPED | OUTPATIENT
Start: 2022-10-17

## 2022-10-17 RX ORDER — CHLORPHENIRAMINE MALEATE 4 MG
TABLET ORAL 2 TIMES DAILY
Qty: 45 G | Refills: 0 | Status: SHIPPED | OUTPATIENT
Start: 2022-10-17

## 2022-10-17 NOTE — PROGRESS NOTES
Assessment/Plan:     Diagnoses and all orders for this visit:    1. Type 2 diabetes mellitus without complication, without long-term current use of insulin (HCC)  -     METABOLIC PANEL, BASIC; Future  -     HEMOGLOBIN A1C WITH EAG; Future  -     insulin glargine (LANTUS,BASAGLAR) 100 unit/mL (3 mL) inpn; 10 Units by SubCUTAneous route daily.  -Chronic. Uncontrolled. Previous A1c 11.0. Recent blood sugars have been running in the upper 300s. -Patient has been off of his glipizide for the past 4 weeks. Patient desires to start insulin.  -Do not restart the Glipizide and start Lantus 10 units daily. Advised patient to schedule nurse visit to review how to properly use Lantus pen  -Advised patient to monitor her sugars closely including for hypoglycemia.  -Check routine lab work again.  -Discussed dietary and lifestyle modifications  -Follow-up in about 1 week for reevaluation    2. Essential hypertension  -Chronic. Well-controlled. -Continue on amlodipine 10 mg daily. 3. Increased urinary frequency  -Chronic increased urinary frequency. UA does not show signs of urinary tract infection. -Very likely secondary to his uncontrolled diabetes.  -Discussed importance of gaining better glycemic control    4. Balanitis  -     CULTURE, URINE; Future  -     REFERRAL TO UROLOGY  -     clotrimazole (LOTRIMIN) 1 % topical cream; Apply  to affected area two (2) times a day. -Patient has persistent symptoms consistent with balanitis. -Treat with clotrimazole topically twice daily for 1 to 3 weeks  -Also discussed the importance of gaining tighter glycemic control  -Given the chronicity and other concerning symptoms will refer to urology    5. Chronic cough  -     XR CHEST PA LAT; Future  -     omeprazole (PRILOSEC) 20 mg capsule; Take 1 Capsule by mouth daily.  -Noted for the past several months.  -Will obtain a chest x-ray today for further evaluation.   -Given symptoms are mostly at night will try treating for possible acid reflux with omeprazole daily.  -If no improvement noted consider additional work-up    6. Difficulty urinating  -     CT/NG/T.VAGINALIS AMPLIFICATION; Future  -     CULTURE, URINE; Future  -     REFERRAL TO UROLOGY  -Patient has painful urination and having to strain to urinate.  -Concern for possible urethral stricture therefore referred to urology for further evaluation  -Sending urine for urine culture as well as STD screening      Follow-up and Dispositions    Return in about 1 week (around 10/24/2022), or if symptoms worsen or fail to improve. Discussed expected course/resolution/complications of diagnosis in detail with patient. Medication risks/benefits/costs/interactions/alternatives discussed with patient. Pt expressed understanding with the diagnosis and plan. Subjective:      Kim De Souza is a 45 y.o. male who presents for had concerns including Bladder Infection (Patient reports last few days urinary pain. Patient report for like he has blockage. ) and Diabetes. Current Outpatient Medications   Medication Sig Dispense Refill    omeprazole (PRILOSEC) 20 mg capsule Take 1 Capsule by mouth daily. 90 Capsule 0    insulin glargine (LANTUS,BASAGLAR) 100 unit/mL (3 mL) inpn 10 Units by SubCUTAneous route daily. 2 Pen 1    clotrimazole (LOTRIMIN) 1 % topical cream Apply  to affected area two (2) times a day. 45 g 0    amLODIPine-atorvastatin (CADUET) 10-40 mg per tablet Take 1 Tablet by mouth in the morning. 30 Tablet 0    albuterol (PROVENTIL HFA, VENTOLIN HFA, PROAIR HFA) 90 mcg/actuation inhaler Take 2 Puffs by inhalation every four (4) hours as needed for Cough. 6.7 g 0    Blood-Glucose Meter monitoring kit Check fasting glucose daily. Can substitute brand covered by insurance 1 Kit 0    lancets misc Check fasting glucose daily.  Can substitute brand covered by insurance 100 Each 2    glucose blood VI test strips (ASCENSIA AUTODISC VI, ONE TOUCH ULTRA TEST VI) strip Check fasting glucose daily. Can substitute brand covered by insurance 100 Strip 2       No Known Allergies  Past Medical History:   Diagnosis Date    Diabetes ([facility] [address]     Hypercholesterolemia     Hypertension      No past surgical history on file. Family History   Problem Relation Age of Onset    Diabetes Father      Social History     Socioeconomic History    Marital status: SINGLE     Spouse name: Not on file    Number of children: Not on file    Years of education: Not on file    Highest education level: Not on file   Occupational History    Not on file   Tobacco Use    Smoking status: Never    Smokeless tobacco: Never   Vaping Use    Vaping Use: Never used   Substance and Sexual Activity    Alcohol use: Never    Drug use: Never    Sexual activity: Not on file   Other Topics Concern    Not on file   Social History Narrative    Not on file     Social Determinants of Health     Financial Resource Strain: Not on file   Food Insecurity: Not on file   Transportation Needs: Not on file   Physical Activity: Sufficiently Active    Days of Exercise per Week: 3 days    Minutes of Exercise per Session: 60 min   Stress: Not on file   Social Connections: Not on file   Intimate Partner Violence: Not At Risk    Fear of Current or Ex-Partner: No    Emotionally Abused: No    Physically Abused: No    Sexually Abused: No   Housing Stability: Not on file       Patient is a 64-year-old male who presents to the office today for follow-up. Patient had briefly gone back to previous PCP in [place] but states now he is back in this area again. He does state that he has not been taking his medication glipizide and his sugars have been running in the upper 300s. He does complain of having to push significantly with urination primarily at the end of the urethra. He does endorse a burning sensation with urination as well as after ejaculation.   He states the balanitis is still present and he was given an additional dose of Diflucan but this did not help. He does have increased urinary frequency however his glucose is uncontrolled. This has been going on for over the past month and has been increasing in frequency. Also of note he does endorse a chronic cough for the past several months. He was given albuterol to try to assist with this and it did not help significantly. He notes the cough primarily at night and first thing in the morning. He denies any significant nasal congestion and denies heartburn. Diabetes Follow-up:   -------------------------------  Home glucose Readings - Glucose has been about 397  Hypoglycemia - No  Current Medications - He has been off the glipizide for 4 weeks now, not taking any medications  Yearly eye exam - Per patient her is UTD on eye exam   Microalbumin/Cr - Normal 6/2022  Statin Therapy - Atorvastatin 40 mg daily   ACEi/ARB - None        ROS:   Review of Systems   Constitutional:  Negative for chills and fever. HENT:  Negative for congestion. Respiratory:  Negative for cough and shortness of breath. Cardiovascular:  Negative for chest pain and leg swelling. Gastrointestinal:  Negative for abdominal pain, nausea and vomiting. Genitourinary:  Positive for dysuria and frequency. Neurological:  Negative for dizziness, tingling, weakness and headaches. Objective:   Visit Vitals  /85 (BP 1 Location: Left upper arm, BP Patient Position: Sitting, BP Cuff Size: Adult)   Pulse (!) 107   Temp 97.7 °F (36.5 °C) (Skin)   Resp 18   Ht 5' 10\" (1.778 m)   Wt 222 lb (100.7 kg)   SpO2 97%   BMI 31.85 kg/m²         Vitals and Nurse Documentation reviewed. Physical Exam  Exam conducted with a chaperone present. Constitutional:       General: He is not in acute distress. Appearance: Normal appearance. He is obese. He is not toxic-appearing. HENT:      Head: Normocephalic and atraumatic.    Eyes:      Conjunctiva/sclera: Conjunctivae normal.   Cardiovascular:      Rate and Rhythm: Normal rate and regular rhythm. Pulses: Normal pulses. Heart sounds: Normal heart sounds. No murmur heard. No gallop. Pulmonary:      Effort: Pulmonary effort is normal. No respiratory distress. Breath sounds: Normal breath sounds. No wheezing or rhonchi. Abdominal:      General: Bowel sounds are normal. There is no distension. Palpations: Abdomen is soft. Tenderness: There is no abdominal tenderness. There is no guarding. Hernia: There is no hernia in the left inguinal area or right inguinal area. Genitourinary:     Pubic Area: No rash. Penis: Uncircumcised. Testes: Normal.      Comments: Penis: Retracted foreskin erythematous with white curd-like exudate noted on foreskin consistent with yeast/balanitis. Patient is uncircumcised. Unable to fully visualize the glans penis (nurse chaperone present for exam)  Musculoskeletal:      Right lower leg: No edema. Left lower leg: No edema. Neurological:      Mental Status: He is alert and oriented to person, place, and time.       Gait: Gait normal.       Results for orders placed or performed in visit on 10/17/22   AMB POC URINALYSIS DIP STICK AUTO W/ MICRO   Result Value Ref Range    Color (UA POC) Morena     Clarity (UA POC) Slightly Cloudy     Glucose (UA POC) 3+ Negative    Bilirubin (UA POC) 1+ Negative    Ketones (UA POC) Negative Negative    Specific gravity (UA POC) 1.030 1.001 - 1.035    Blood (UA POC) Negative Negative    pH (UA POC) 5.5 4.6 - 8.0    Protein (UA POC) 1+ Negative    Urobilinogen (UA POC) 0.2 mg/dL 0.2 - 1    Nitrites (UA POC) Negative Negative    Leukocyte esterase (UA POC) Negative Negative

## 2022-10-17 NOTE — PROGRESS NOTES
1. Have you been to the ER, urgent care clinic since your last visit? Hospitalized since your last visit? No    2. Have you seen or consulted any other health care providers outside of the [address] [place] [address] since your last visit? Include any pap smears or colon screening. No    Chief Complaint   Patient presents with    Bladder Infection     Patient reports last few days urinary pain. Diabetes     Health Maintenance Due   Topic Date Due    Hepatitis C Screening  Never done    COVID-19 Vaccine (1) Never done    Pneumococcal 0-64 years (1 - PCV) Never done    Foot Exam Q1  Never done    Eye Exam Retinal or Dilated  Never done    Hepatitis B Vaccine (1 of 3 - Risk 3-dose series) Never done    DTaP/Tdap/Td series (1 - Tdap) Never done    Colorectal Cancer Screening Combo  Never done    Shingrix Vaccine Age 50> (1 of 2) Never done    Flu Vaccine (1) Never done    A1C test (Diabetic or Prediabetic)  10/12/2022     3 most recent PHQ Screens 10/17/2022   Little interest or pleasure in doing things Not at all   Feeling down, depressed, irritable, or hopeless Not at all   Total Score PHQ 2 0     Abuse Screening Questionnaire 10/17/2022   Do you ever feel afraid of your partner? N   Are you in a relationship with someone who physically or mentally threatens you? N   Is it safe for you to go home?  Y     Visit Vitals  /85 (BP 1 Location: Left upper arm, BP Patient Position: Sitting, BP Cuff Size: Adult)   Pulse (!) 109   Temp 97.7 °F (36.5 °C) (Skin)   Resp 18   Ht 5' 10\" (1.778 m)   Wt 222 lb (100.7 kg)   SpO2 97%   BMI 31.85 kg/m²

## 2022-10-24 LAB
BACTERIA UR CULT: ABNORMAL
BUN SERPL-MCNC: 12 MG/DL (ref 6–24)
BUN/CREAT SERPL: 9 (ref 9–20)
C TRACH RRNA SPEC QL NAA+PROBE: NEGATIVE
CALCIUM SERPL-MCNC: 9.1 MG/DL (ref 8.7–10.2)
CHLORIDE SERPL-SCNC: 102 MMOL/L (ref 96–106)
CO2 SERPL-SCNC: 18 MMOL/L (ref 20–29)
CREAT SERPL-MCNC: 1.35 MG/DL (ref 0.76–1.27)
EGFR: 62 ML/MIN/1.73
EST. AVERAGE GLUCOSE BLD GHB EST-MCNC: 258 MG/DL
GLUCOSE SERPL-MCNC: 343 MG/DL (ref 70–99)
HBA1C MFR BLD: 10.6 % (ref 4.8–5.6)
N GONORRHOEA RRNA SPEC QL NAA+PROBE: NEGATIVE
POTASSIUM SERPL-SCNC: 4.7 MMOL/L (ref 3.5–5.2)
SODIUM SERPL-SCNC: 137 MMOL/L (ref 134–144)
T VAGINALIS RRNA SPEC QL NAA+PROBE: NEGATIVE

## 2022-10-25 NOTE — PROGRESS NOTES
Please call patient to clarify if he has had an appointment with urology yet. His urine culture did grow yeast however it is uncertain whether this could be stemming from the external yeast infection he has versus an actual infection in the urine. If he has already seen urology I would like to fax over the results to see what their recommendations are. Please also inform the patient I would like him to schedule an ASAP appointment to discuss his diabetes and renal function further given his A1c is 10. 6.

## 2022-10-26 NOTE — PROGRESS NOTES
Rashaun Laragilmar is requesting that his last 3 medications you prescribed, Omeprazole, Insulin, Lotrim go to [facility] [address].

## 2022-10-27 ENCOUNTER — OFFICE VISIT (OUTPATIENT)
Dept: FAMILY MEDICINE CLINIC | Age: 52
End: 2022-10-27
Payer: MEDICAID

## 2022-10-27 VITALS
OXYGEN SATURATION: 95 % | HEART RATE: 91 BPM | RESPIRATION RATE: 16 BRPM | TEMPERATURE: 97.6 F | SYSTOLIC BLOOD PRESSURE: 123 MMHG | HEIGHT: 70 IN | DIASTOLIC BLOOD PRESSURE: 76 MMHG | WEIGHT: 226 LBS | BODY MASS INDEX: 32.35 KG/M2

## 2022-10-27 DIAGNOSIS — Z12.11 COLON CANCER SCREENING: ICD-10-CM

## 2022-10-27 DIAGNOSIS — E11.9 TYPE 2 DIABETES MELLITUS WITHOUT COMPLICATION, WITHOUT LONG-TERM CURRENT USE OF INSULIN (HCC): Primary | ICD-10-CM

## 2022-10-27 DIAGNOSIS — R39.198 DIFFICULTY URINATING: ICD-10-CM

## 2022-10-27 PROCEDURE — 3078F DIAST BP <80 MM HG: CPT | Performed by: STUDENT IN AN ORGANIZED HEALTH CARE EDUCATION/TRAINING PROGRAM

## 2022-10-27 PROCEDURE — 99213 OFFICE O/P EST LOW 20 MIN: CPT | Performed by: STUDENT IN AN ORGANIZED HEALTH CARE EDUCATION/TRAINING PROGRAM

## 2022-10-27 PROCEDURE — 3074F SYST BP LT 130 MM HG: CPT | Performed by: STUDENT IN AN ORGANIZED HEALTH CARE EDUCATION/TRAINING PROGRAM

## 2022-10-27 PROCEDURE — 3046F HEMOGLOBIN A1C LEVEL >9.0%: CPT | Performed by: STUDENT IN AN ORGANIZED HEALTH CARE EDUCATION/TRAINING PROGRAM

## 2022-10-27 NOTE — PROGRESS NOTES
Chief Complaint   Patient presents with    Diabetes     1. Have you been to the ER, urgent care clinic since your last visit? Hospitalized since your last visit? No    2. Have you seen or consulted any other health care providers outside of the [address] [place] [address] since your last visit? Include any pap smears or colon screening.  No

## 2022-10-27 NOTE — PROGRESS NOTES
1. Have you been to the ER, urgent care clinic since your last visit? Hospitalized since your last visit? No    2. Have you seen or consulted any other health care providers outside of the [address] [place] [address] since your last visit? Include any pap smears or colon screening. No    Chief Complaint   Patient presents with    Diabetes    Medication Refill    Asthma     Health Maintenance Due   Topic Date Due    Hepatitis C Screening  Never done    COVID-19 Vaccine (1) Never done    Pneumococcal 0-64 years (1 - PCV) Never done    Foot Exam Q1  Never done    Eye Exam Retinal or Dilated  Never done    Hepatitis B Vaccine (1 of 3 - Risk 3-dose series) Never done    DTaP/Tdap/Td series (1 - Tdap) Never done    Colorectal Cancer Screening Combo  Never done    Shingrix Vaccine Age 50> (1 of 2) Never done    Flu Vaccine (1) Never done     3 most recent PHQ Screens 10/27/2022   Little interest or pleasure in doing things Several days   Feeling down, depressed, irritable, or hopeless Several days   Total Score PHQ 2 2     Abuse Screening Questionnaire 10/17/2022   Do you ever feel afraid of your partner? N   Are you in a relationship with someone who physically or mentally threatens you? N   Is it safe for you to go home?  Y     Visit Vitals  /76 (BP 1 Location: Left upper arm, BP Patient Position: Sitting, BP Cuff Size: Adult)   Pulse 91   Temp 97.6 °F (36.4 °C) (Skin)   Resp 16   Ht 5' 10\" (1.778 m)   Wt 226 lb (102.5 kg)   SpO2 95%   BMI 32.43 kg/m²

## 2022-10-27 NOTE — PROGRESS NOTES
Assessment/Plan:     Diagnoses and all orders for this visit:    1. Type 2 diabetes mellitus without complication, without long-term current use of insulin ([facility])  -     REFERRAL TO DIABETIC EDUCATION  -Chronic. Uncontrolled. Recent A1C of 10.6.   -Patient has not yet picked up the Lantus therefore advised patient to  his prescription and schedule a nurse visit to review how to properly use Lantus pen  -Advised patient to monitor his sugars closely including for hypoglycemia.  -Will refer to diabetic education.  -Discussed dietary and lifestyle modifications  -Follow-up in about 1 week for reevaluation    2. Difficulty urinating  -Patient is having to strain to urinate but symptoms have improved some since last visit and denies dysuria.  -Concern for possible urethral stricture therefore referred to urology for further evaluation and has appointment on 11/7  -Patient was noted to have Candida on his urine culture however he also has significant external yeast infection that likely contaminated the urine. Advised patient to discuss with urology at upcoming appointment. 3. Colon cancer screening  -     REFERRAL TO GASTROENTEROLOGY      Follow-up and Dispositions    Return in about 2 weeks (around 11/10/2022), or if symptoms worsen or fail to improve. Discussed expected course/resolution/complications of diagnosis in detail with patient. Medication risks/benefits/costs/interactions/alternatives discussed with patient. Pt expressed understanding with the diagnosis and plan    Subjective:      Connie Mathis is a 45 y.o. male who presents for had concerns including Diabetes and Medication Refill. Current Outpatient Medications   Medication Sig Dispense Refill    omeprazole (PRILOSEC) 20 mg capsule Take 1 Capsule by mouth daily. 90 Capsule 0    insulin glargine (LANTUS,BASAGLAR) 100 unit/mL (3 mL) inpn 10 Units by SubCUTAneous route daily.  2 Pen 1    clotrimazole (LOTRIMIN) 1 % topical cream Apply  to affected area two (2) times a day. 45 g 0    amLODIPine-atorvastatin (CADUET) 10-40 mg per tablet Take 1 Tablet by mouth in the morning. 30 Tablet 0    albuterol (PROVENTIL HFA, VENTOLIN HFA, PROAIR HFA) 90 mcg/actuation inhaler Take 2 Puffs by inhalation every four (4) hours as needed for Cough. 6.7 g 0    Blood-Glucose Meter monitoring kit Check fasting glucose daily. Can substitute brand covered by insurance 1 Kit 0    lancets misc Check fasting glucose daily. Can substitute brand covered by insurance 100 Each 2    glucose blood VI test strips (ASCENSIA AUTODISC VI, ONE TOUCH ULTRA TEST VI) strip Check fasting glucose daily. Can substitute brand covered by insurance 100 Strip 2       No Known Allergies  Past Medical History:   Diagnosis Date    Diabetes ([facility] [address]     Hypercholesterolemia     Hypertension      No past surgical history on file. Family History   Problem Relation Age of Onset    Diabetes Father      Social History     Socioeconomic History    Marital status: SINGLE     Spouse name: Not on file    Number of children: Not on file    Years of education: Not on file    Highest education level: Not on file   Occupational History    Not on file   Tobacco Use    Smoking status: Never    Smokeless tobacco: Never   Vaping Use    Vaping Use: Never used   Substance and Sexual Activity    Alcohol use: Never    Drug use: Never    Sexual activity: Not on file   Other Topics Concern    Not on file   Social History Narrative    Not on file     Social Determinants of Health     Financial Resource Strain: Not on file   Food Insecurity: Not on file   Transportation Needs: Not on file   Physical Activity: Sufficiently Active    Days of Exercise per Week: 3 days    Minutes of Exercise per Session: 60 min   Stress: Not on file   Social Connections: Not on file   Intimate Partner Violence: Not At Risk    Fear of Current or Ex-Partner: No    Emotionally Abused: No    Physically Abused: No    Sexually Abused:  No Housing Stability: Not on file       Patient is a 69-year-old male who presents to the office today for follow-up of his diabetes as well as difficulty urinating. Patient was referred to urology and had an appointment on 10/21 however he missed this appointment due to having to deal with an unexpected friend's passing. He has a rescheduled appointment on 11/3. His urine culture did note Candida however he also has external balanitis present that likely was a contaminant. He states the straining with urination has improved since last follow-up however is still present. He denies any dysuria, urgency and the frequency is stable and secondary to his hyperglycemia. He has not yet picked up his prescriptions that were prescribed 10 days ago but does plan to go to the pharmacy today to pick them up. Of note patient declines all immunizations today. Diabetes Follow-up:   -------------------------------  Home glucose Readings - Has not checked it in the past couple of weeks   Hypoglycemia - No  Current Medications - No currently taking any medications, has not yet picked up Lantus 10 units daily   Yearly eye exam - Per patient he is UTD on eye exam   Microalbumin/Cr - Normal 6/2022  Statin Therapy - Atorvastatin 40 mg daily   ACEi/ARB - None        ROS:   Review of Systems   Constitutional:  Negative for chills and fever. HENT:  Negative for congestion. Respiratory:  Positive for cough. Negative for shortness of breath. Cardiovascular:  Negative for chest pain and leg swelling. Gastrointestinal:  Negative for abdominal pain, nausea and vomiting. Genitourinary:  Negative for dysuria and urgency. Neurological:  Negative for dizziness and headaches.      Objective:   Visit Vitals  /76 (BP 1 Location: Left upper arm, BP Patient Position: Sitting, BP Cuff Size: Adult)   Pulse 91   Temp 97.6 °F (36.4 °C) (Skin)   Resp 16   Ht 5' 10\" (1.778 m)   Wt 226 lb (102.5 kg)   SpO2 95%   BMI 32.43 kg/m² Vitals and Nurse Documentation reviewed. Physical Exam  Constitutional:       General: He is not in acute distress. Appearance: Normal appearance. He is obese. He is not toxic-appearing. HENT:      Head: Normocephalic and atraumatic. Eyes:      Conjunctiva/sclera: Conjunctivae normal.   Cardiovascular:      Rate and Rhythm: Normal rate and regular rhythm. Pulses: Normal pulses. Heart sounds: Normal heart sounds. No murmur heard. No gallop. Pulmonary:      Effort: Pulmonary effort is normal. No respiratory distress. Breath sounds: Normal breath sounds. No wheezing or rhonchi. Abdominal:      General: Bowel sounds are normal. There is no distension. Palpations: Abdomen is soft. Tenderness: There is no abdominal tenderness. There is no guarding. Musculoskeletal:      Right lower leg: No edema. Left lower leg: No edema. Neurological:      Mental Status: He is alert and oriented to person, place, and time.       Gait: Gait normal.       Results for orders placed or performed in visit on 10/17/22   CULTURE, URINE    Specimen: Urine   Result Value Ref Range    Urine Culture, Routine (A)      Candida albicans  25,000-50,000 colony forming units per mL     METABOLIC PANEL, BASIC   Result Value Ref Range    Glucose 343 (H) 70 - 99 mg/dL    BUN 12 6 - 24 mg/dL    Creatinine 1.35 (H) 0.76 - 1.27 mg/dL    eGFR 62 >59 mL/min/1.73    BUN/Creatinine ratio 9 9 - 20    Sodium 137 134 - 144 mmol/L    Potassium 4.7 3.5 - 5.2 mmol/L    Chloride 102 96 - 106 mmol/L    CO2 18 (L) 20 - 29 mmol/L    Calcium 9.1 8.7 - 10.2 mg/dL   CT/NG/T.VAGINALIS AMPLIFICATION   Result Value Ref Range    C. trachomatis by CARON Negative Negative    N. gonorrhoeae by CARON Negative Negative    T. vaginalis by CARON Negative Negative   HEMOGLOBIN A1C WITH EAG   Result Value Ref Range    Hemoglobin A1c 10.6 (H) 4.8 - 5.6 %    Estimated average glucose 258 mg/dL   AMB POC URINALYSIS DIP STICK AUTO W/ MICRO Result Value Ref Range    Color (UA POC) Morena     Clarity (UA POC) Slightly Cloudy     Glucose (UA POC) 3+ Negative    Bilirubin (UA POC) 1+ Negative    Ketones (UA POC) Negative Negative    Specific gravity (UA POC) 1.030 1.001 - 1.035    Blood (UA POC) Negative Negative    pH (UA POC) 5.5 4.6 - 8.0    Protein (UA POC) 1+ Negative    Urobilinogen (UA POC) 0.2 mg/dL 0.2 - 1    Nitrites (UA POC) Negative Negative    Leukocyte esterase (UA POC) Negative Negative

## 2022-10-28 RX ORDER — PEN NEEDLE, DIABETIC 30 GX3/16"
NEEDLE, DISPOSABLE MISCELLANEOUS
Qty: 100 EACH | Refills: 1 | Status: SHIPPED | OUTPATIENT
Start: 2022-10-28

## 2022-11-01 ENCOUNTER — CLINICAL SUPPORT (OUTPATIENT)
Dept: FAMILY MEDICINE CLINIC | Age: 52
End: 2022-11-01

## 2022-11-01 DIAGNOSIS — E11.9 TYPE 2 DIABETES MELLITUS WITHOUT COMPLICATION, WITHOUT LONG-TERM CURRENT USE OF INSULIN (HCC): Primary | ICD-10-CM

## 2023-02-07 ENCOUNTER — TELEPHONE (OUTPATIENT)
Dept: FAMILY MEDICINE CLINIC | Age: 53
End: 2023-02-07

## 2023-02-07 DIAGNOSIS — E11.9 TYPE 2 DIABETES MELLITUS WITHOUT COMPLICATION, WITHOUT LONG-TERM CURRENT USE OF INSULIN (HCC): ICD-10-CM

## 2023-02-07 RX ORDER — AMLODIPINE BESYLATE AND ATORVASTATIN CALCIUM 10; 40 MG/1; MG/1
1 TABLET, FILM COATED ORAL DAILY
Qty: 30 TABLET | Refills: 0 | Status: SHIPPED | OUTPATIENT
Start: 2023-02-07 | End: 2023-04-12 | Stop reason: SDUPTHER

## 2023-02-07 RX ORDER — PEN NEEDLE, DIABETIC 30 GX3/16"
NEEDLE, DISPOSABLE MISCELLANEOUS
Qty: 100 EACH | Refills: 1 | Status: SHIPPED | OUTPATIENT
Start: 2023-02-07

## 2023-02-07 NOTE — TELEPHONE ENCOUNTER
PA for amLODIPine-atorvastatin (CADUET) 10-40 mg per tablet  Approved case 3  [id number] effective 2/7/23-2/7/24

## 2023-04-12 PROBLEM — R05.3 CHRONIC COUGH: Status: ACTIVE | Noted: 2023-04-12

## 2023-04-12 PROBLEM — E78.00 HYPERCHOLESTEROLEMIA: Status: ACTIVE | Noted: 2023-04-12

## 2023-04-18 DIAGNOSIS — R05.3 CHRONIC COUGH: ICD-10-CM

## 2023-04-18 DIAGNOSIS — I10 ESSENTIAL HYPERTENSION: ICD-10-CM

## 2023-04-18 DIAGNOSIS — E78.00 HYPERCHOLESTEROLEMIA: ICD-10-CM

## 2023-04-18 RX ORDER — AMLODIPINE BESYLATE AND ATORVASTATIN CALCIUM 10; 40 MG/1; MG/1
1 TABLET, FILM COATED ORAL DAILY
Qty: 90 TABLET | Refills: 1 | Status: SHIPPED | OUTPATIENT
Start: 2023-04-18

## 2023-04-18 RX ORDER — OMEPRAZOLE 20 MG/1
20 CAPSULE, DELAYED RELEASE ORAL DAILY
Qty: 90 CAPSULE | Refills: 1 | Status: SHIPPED | OUTPATIENT
Start: 2023-04-18

## 2023-04-18 NOTE — TELEPHONE ENCOUNTER
PCP: Efrem Dc MD    Last appt: 4/12/2023  Future Appointments   Date Time Provider Umer Vazquez   4/27/2023  1:20 PM Efrem Dc MD [facility]       Requested Prescriptions     Pending Prescriptions Disp Refills    amLODIPine-atorvastatin (CADUET) 10-40 mg per tablet 90 Tablet 1     Sig: Take 1 Tablet by mouth daily. omeprazole (PRILOSEC) 20 mg capsule 90 Capsule 1     Sig: Take 1 Capsule by mouth daily.        Prior labs and Blood pressures:  BP Readings from Last 3 Encounters:   04/12/23 119/75   10/27/22 123/76   10/17/22 122/85     Lab Results   Component Value Date/Time    Sodium 137 04/12/2023 12:00 AM    Potassium 4.5 04/12/2023 12:00 AM    Chloride 99 04/12/2023 12:00 AM    CO2 17 (L) 04/12/2023 12:00 AM    Anion gap 6 08/15/2022 02:04 PM    Glucose 341 (H) 04/12/2023 12:00 AM    BUN 14 04/12/2023 12:00 AM    Creatinine 0.99 04/12/2023 12:00 AM    BUN/Creatinine ratio 14 04/12/2023 12:00 AM    GFR est AA >60 08/15/2022 02:04 PM    GFR est non-AA >60 08/15/2022 02:04 PM    Calcium 9.1 04/12/2023 12:00 AM     Lab Results   Component Value Date/Time    Hemoglobin A1c 9.8 (H) 04/12/2023 12:00 AM     Lab Results   Component Value Date/Time    Cholesterol, total 250 (H) 04/12/2023 12:00 AM    HDL Cholesterol 37 (L) 04/12/2023 12:00 AM    LDL, calculated 154 (H) 04/12/2023 12:00 AM    VLDL, calculated 59 (H) 04/12/2023 12:00 AM    Triglyceride 317 (H) 04/12/2023 12:00 AM     Lab Results   Component Value Date/Time    VITAMIN D, 25-HYDROXY 10.9 (L) 04/12/2023 12:00 AM       Lab Results   Component Value Date/Time    TSH 3.350 04/12/2023 12:00 AM

## 2023-04-27 ENCOUNTER — OFFICE VISIT (OUTPATIENT)
Dept: FAMILY MEDICINE CLINIC | Age: 53
End: 2023-04-27
Payer: MEDICAID

## 2023-04-27 VITALS
DIASTOLIC BLOOD PRESSURE: 79 MMHG | TEMPERATURE: 97.3 F | HEIGHT: 71 IN | HEART RATE: 99 BPM | OXYGEN SATURATION: 99 % | RESPIRATION RATE: 20 BRPM | BODY MASS INDEX: 31.89 KG/M2 | SYSTOLIC BLOOD PRESSURE: 129 MMHG | WEIGHT: 227.8 LBS

## 2023-04-27 DIAGNOSIS — E78.00 HYPERCHOLESTEROLEMIA: ICD-10-CM

## 2023-04-27 DIAGNOSIS — E11.9 TYPE 2 DIABETES MELLITUS WITHOUT COMPLICATION, WITHOUT LONG-TERM CURRENT USE OF INSULIN (HCC): Primary | ICD-10-CM

## 2023-04-27 DIAGNOSIS — E55.9 VITAMIN D DEFICIENCY: ICD-10-CM

## 2023-04-27 DIAGNOSIS — I10 ESSENTIAL HYPERTENSION: ICD-10-CM

## 2023-04-27 PROCEDURE — 3078F DIAST BP <80 MM HG: CPT | Performed by: STUDENT IN AN ORGANIZED HEALTH CARE EDUCATION/TRAINING PROGRAM

## 2023-04-27 PROCEDURE — 3074F SYST BP LT 130 MM HG: CPT | Performed by: STUDENT IN AN ORGANIZED HEALTH CARE EDUCATION/TRAINING PROGRAM

## 2023-04-27 PROCEDURE — 3046F HEMOGLOBIN A1C LEVEL >9.0%: CPT | Performed by: STUDENT IN AN ORGANIZED HEALTH CARE EDUCATION/TRAINING PROGRAM

## 2023-04-27 PROCEDURE — 99214 OFFICE O/P EST MOD 30 MIN: CPT | Performed by: STUDENT IN AN ORGANIZED HEALTH CARE EDUCATION/TRAINING PROGRAM

## 2023-04-27 RX ORDER — AMLODIPINE BESYLATE AND BENAZEPRIL HYDROCHLORIDE 10; 40 MG/1; MG/1
1 CAPSULE ORAL DAILY
COMMUNITY
Start: 2023-03-12 | End: 2023-04-27

## 2023-04-27 RX ORDER — ATORVASTATIN CALCIUM 80 MG/1
80 TABLET, FILM COATED ORAL DAILY
Qty: 90 TABLET | Refills: 1 | Status: SHIPPED | OUTPATIENT
Start: 2023-04-27

## 2023-04-27 RX ORDER — ASPIRIN 325 MG
50000 TABLET, DELAYED RELEASE (ENTERIC COATED) ORAL
Qty: 12 CAPSULE | Refills: 0 | Status: SHIPPED | OUTPATIENT
Start: 2023-04-27

## 2023-04-27 RX ORDER — AMLODIPINE BESYLATE 10 MG/1
10 TABLET ORAL DAILY
Qty: 90 TABLET | Refills: 1 | Status: SHIPPED | OUTPATIENT
Start: 2023-04-27

## 2023-04-27 RX ORDER — SEMAGLUTIDE 0.68 MG/ML
INJECTION, SOLUTION SUBCUTANEOUS
Qty: 2 PEN | Refills: 1 | Status: SHIPPED | OUTPATIENT
Start: 2023-04-27 | End: 2023-07-26

## 2023-04-27 NOTE — PROGRESS NOTES
Assessment/Plan:     Diagnoses and all orders for this visit:    1. Type 2 diabetes mellitus without complication, without long-term current use of insulin ([facility])  -     semaglutide (Ozempic) 0.25 mg or 0.5 mg (2 mg/3 mL) pnij; 0.25 mg every seven (7) days for 28 days, THEN 0.5 mg every seven (7) days for 62 days.  -Chronic. Uncontrolled. A1c of 9.8  -Continue Lantus 10 units daily  -Will add Ozempic for additional glycemic control  -Discussed medication risks and side effects  -Follow-up in 3 months for reevaluation or sooner. 2. Hypercholesterolemia  -     atorvastatin (LIPITOR) 80 mg tablet; Take 1 Tablet by mouth daily.  -Chronic. Uncontrolled. -Patient states he has been compliant with his atorvastatin 40 mg daily  -Therefore will increase atorvastatin to 80 mg daily  -Continue working on improving diet  -Repeat lipid panel in 3 months    3. Vitamin D deficiency  -     cholecalciferol (VITAMIN D3) (50,000 UNITS /1250 MCG) capsule; Take 1 Capsule by mouth every seven (7) days.  -Low vitamin D level noted on lab work  -Treat with vitamin D3 50,000 units once a week for 12 weeks  -After completing this course recommend taking 1000 units daily thereafter    4. Essential hypertension  -     amLODIPine (NORVASC) 10 mg tablet; Take 1 Tablet by mouth daily.  -Chronic. Well-controlled. -Continue on amlodipine 10 mg daily. Follow-up and Dispositions    Return in about 3 months (around 7/27/2023), or if symptoms worsen or fail to improve. Discussed expected course/resolution/complications of diagnosis in detail with patient. Medication risks/benefits/costs/interactions/alternatives discussed with patient. Pt expressed understanding with the diagnosis and plan. Subjective:      Jeni Hayes is a 52 y.o. male who presents for had concerns including Diabetes (And Labs follow up).      Current Outpatient Medications   Medication Sig Dispense Refill    cholecalciferol (VITAMIN D3) (50,000 UNITS /1250 MCG) capsule Take 1 Capsule by mouth every seven (7) days. 12 Capsule 0    semaglutide (Ozempic) 0.25 mg or 0.5 mg (2 mg/3 mL) pnij 0.25 mg every seven (7) days for 28 days, THEN 0.5 mg every seven (7) days for 62 days. 2 Pen 1    amLODIPine (NORVASC) 10 mg tablet Take 1 Tablet by mouth daily. 90 Tablet 1    atorvastatin (LIPITOR) 80 mg tablet Take 1 Tablet by mouth daily. 90 Tablet 1    omeprazole (PRILOSEC) 20 mg capsule Take 1 Capsule by mouth daily. 90 Capsule 1    lancets misc Check fasting glucose daily. Can substitute brand covered by insurance 100 Each 2    Blood-Glucose Meter monitoring kit Check fasting glucose daily. Can substitute brand covered by insurance 1 Kit 0    glucose blood VI test strips strip Check fasting glucose daily. Can substitute brand covered by insurance 100 Strip 2    Insulin Needles, Disposable, 31 gauge x 5/16\" ndle Use with Lantus Pen once daily 100 Each 1    insulin glargine (LANTUS,BASAGLAR) 100 unit/mL (3 mL) inpn 10 Units by SubCUTAneous route daily. 2 Pen 1    clotrimazole (LOTRIMIN) 1 % topical cream Apply  to affected area two (2) times a day. 45 g 0    albuterol (PROVENTIL HFA, VENTOLIN HFA, PROAIR HFA) 90 mcg/actuation inhaler Take 2 Puffs by inhalation every four (4) hours as needed for Cough. 6.7 g 0       No Known Allergies  Past Medical History:   Diagnosis Date    Diabetes ([facility] [address]     Hypercholesterolemia     Hypertension      History reviewed. No pertinent surgical history.   Family History   Problem Relation Age of Onset    Diabetes Father      Social History     Socioeconomic History    Marital status: SINGLE     Spouse name: Not on file    Number of children: Not on file    Years of education: Not on file    Highest education level: Not on file   Occupational History    Not on file   Tobacco Use    Smoking status: Never    Smokeless tobacco: Never   Vaping Use    Vaping Use: Never used   Substance and Sexual Activity    Alcohol use: Never    Drug use: Never    Sexual activity: Not on file   Other Topics Concern    Not on file   Social History Narrative    Not on file     Social Determinants of Health     Financial Resource Strain: Not on file   Food Insecurity: Not on file   Transportation Needs: Not on file   Physical Activity: Sufficiently Active    Days of Exercise per Week: 3 days    Minutes of Exercise per Session: 60 min   Stress: Not on file   Social Connections: Not on file   Intimate Partner Violence: Not At Risk    Fear of Current or Ex-Partner: No    Emotionally Abused: No    Physically Abused: No    Sexually Abused: No   Housing Stability: Not on file       Patient is a 49-year-old male who presents to the office today for follow-up of lab work. His lab results did note a persistently elevated cholesterol. He has been compliant and consistently taking atorvastatin 40 mg daily. He also had a low vitamin D level and has not yet been taking supplemental vitamin D. Also of note his A1c was elevated at 9.8. Diabetes Follow-up:   -------------------------------  Home glucose Readings - Has not checked it in the past couple of weeks  Hypoglycemia - No  Current Medications - Lantus 10 units daily   Yearly eye exam - Per patient he is UTD on eye exam   Diabetic Foot exam - Completed 4/2023  Microalbumin/Cr - Normal 7/2022  Statin Therapy - Atorvastatin 40 mg daily   ACEi/ARB - None      ROS:   Review of Systems   Constitutional:  Negative for chills and fever. HENT:  Negative for congestion. Respiratory:  Negative for cough and shortness of breath. Cardiovascular:  Negative for chest pain and leg swelling. Gastrointestinal:  Negative for abdominal pain, nausea and vomiting. Neurological:  Negative for dizziness, tingling, weakness and headaches.      Objective:   Visit Vitals  /79 (BP 1 Location: Right arm, BP Patient Position: Sitting, BP Cuff Size: Adult)   Pulse 99   Temp 97.3 °F (36.3 °C) (Temporal)   Resp 20   Ht 5' 11\" (1.803 m) Wt 227 lb 12.8 oz (103.3 kg)   SpO2 99%   BMI 31.77 kg/m²         Vitals and Nurse Documentation reviewed. Physical Exam  Constitutional:       General: He is not in acute distress. Appearance: Normal appearance. He is obese. He is not toxic-appearing. HENT:      Head: Normocephalic and atraumatic. Eyes:      Conjunctiva/sclera: Conjunctivae normal.   Cardiovascular:      Rate and Rhythm: Normal rate and regular rhythm. Pulses: Normal pulses. Heart sounds: Normal heart sounds. No murmur heard. No gallop. Pulmonary:      Effort: Pulmonary effort is normal. No respiratory distress. Breath sounds: Normal breath sounds. No wheezing or rhonchi. Abdominal:      General: Bowel sounds are normal. There is no distension. Palpations: Abdomen is soft. Tenderness: There is no abdominal tenderness. There is no guarding. Musculoskeletal:      Right lower leg: No edema. Left lower leg: No edema. Neurological:      Mental Status: He is alert and oriented to person, place, and time. Gait: Gait normal.       Results for orders placed or performed in visit on 08/59/80   METABOLIC PANEL, COMPREHENSIVE   Result Value Ref Range    Glucose 341 (H) 70 - 99 mg/dL    BUN 14 6 - 24 mg/dL    Creatinine 0.99 0.76 - 1.27 mg/dL    eGFR 91 >59 mL/min/1.73    BUN/Creatinine ratio 14 9 - 20    Sodium 137 134 - 144 mmol/L    Potassium 4.5 3.5 - 5.2 mmol/L    Chloride 99 96 - 106 mmol/L    CO2 17 (L) 20 - 29 mmol/L    Calcium 9.1 8.7 - 10.2 mg/dL    Protein, total 6.7 6.0 - 8.5 g/dL    Albumin 4.0 3.8 - 4.9 g/dL    GLOBULIN, TOTAL 2.7 1.5 - 4.5 g/dL    A-G Ratio 1.5 1.2 - 2.2    Bilirubin, total 0.3 0.0 - 1.2 mg/dL    Alk.  phosphatase 81 44 - 121 IU/L    AST (SGOT) 18 0 - 40 IU/L    ALT (SGPT) 21 0 - 44 IU/L   LIPID PANEL   Result Value Ref Range    Cholesterol, total 250 (H) 100 - 199 mg/dL    Triglyceride 317 (H) 0 - 149 mg/dL    HDL Cholesterol 37 (L) >39 mg/dL    VLDL, calculated 59 (H) 5 - 40 mg/dL    LDL, calculated 154 (H) 0 - 99 mg/dL   HEMOGLOBIN A1C WITH EAG   Result Value Ref Range    Hemoglobin A1c 9.8 (H) 4.8 - 5.6 %    Estimated average glucose 235 mg/dL   TSH 3RD GENERATION   Result Value Ref Range    TSH 3.350 0.450 - 4.500 uIU/mL   VITAMIN D, 25 HYDROXY   Result Value Ref Range    VITAMIN D, 25-HYDROXY 10.9 (L) 30.0 - 100.0 ng/mL

## 2023-04-27 NOTE — PROGRESS NOTES
Identified pt with two pt identifiers  Chief Complaint   Patient presents with    Diabetes     And Labs follow up        Health Maintenance Due   Topic    Hepatitis C Screening     COVID-19 Vaccine (1)    Pneumococcal 0-64 years (1 - PCV)    Eye Exam Retinal or Dilated     Hepatitis B Vaccine (1 of 3 - Risk 3-dose series)    DTaP/Tdap/Td series (1 - Tdap)    Colorectal Cancer Screening Combo     Shingles Vaccine (1 of 2)       Wt Readings from Last 3 Encounters:   04/27/23 227 lb 12.8 oz (103.3 kg)   04/12/23 226 lb (102.5 kg)   10/27/22 226 lb (102.5 kg)     Temp Readings from Last 3 Encounters:   04/27/23 97.3 °F (36.3 °C) (Temporal)   04/12/23 97.5 °F (36.4 °C) (Temporal)   10/27/22 97.6 °F (36.4 °C) (Skin)     BP Readings from Last 3 Encounters:   04/27/23 129/79   04/12/23 119/75   10/27/22 123/76     Pulse Readings from Last 3 Encounters:   04/27/23 99   04/12/23 90   10/27/22 91         Learning Assessment:  :     No flowsheet data found. Depression Screening:  :     3 most recent PHQ Screens 4/12/2023   Little interest or pleasure in doing things Not at all   Feeling down, depressed, irritable, or hopeless Not at all   Total Score PHQ 2 0       Fall Risk Assessment:  :     No flowsheet data found. Abuse Screening:  :     Abuse Screening Questionnaire 10/17/2022 7/12/2022   Do you ever feel afraid of your partner? N N   Are you in a relationship with someone who physically or mentally threatens you? N N   Is it safe for you to go home? Y Y       Coordination of Care Questionnaire:  :   1. \"Have you been to the ER, urgent care clinic since your last visit? Hospitalized since your last visit? \" No    2. \"Have you seen or consulted any other health care providers outside of the [address] [place] [address] since your last visit? \" No     3. For patients aged 39-70: Has the patient had a colonoscopy / FIT/ Cologuard? No      If the patient is female:    4.  For patients aged 41-77: Has the patient had a mammogram within the past 2 years? No      5. For patients aged 21-65: Has the patient had a pap smear? No     3) Do you have an Advance Directive on file? no  Are you interested in receiving information about Advance Directives? no    Patient is accompanied by self I have received verbal consent from No Shafer to discuss any/all medical information while they are present in the room.

## 2023-05-18 ENCOUNTER — TELEPHONE (OUTPATIENT)
Facility: CLINIC | Age: 53
End: 2023-05-18

## 2023-05-22 ENCOUNTER — TELEPHONE (OUTPATIENT)
Facility: CLINIC | Age: 53
End: 2023-05-22

## 2023-05-25 ENCOUNTER — TELEPHONE (OUTPATIENT)
Facility: CLINIC | Age: 53
End: 2023-05-25

## 2023-05-25 DIAGNOSIS — E11.9 TYPE 2 DIABETES MELLITUS WITHOUT COMPLICATION, WITHOUT LONG-TERM CURRENT USE OF INSULIN (HCC): Primary | ICD-10-CM

## 2023-05-25 RX ORDER — INSULIN GLARGINE 100 [IU]/ML
10 INJECTION, SOLUTION SUBCUTANEOUS NIGHTLY
Qty: 2 ADJUSTABLE DOSE PRE-FILLED PEN SYRINGE | Refills: 2 | Status: SHIPPED | OUTPATIENT
Start: 2023-05-25

## 2023-05-25 NOTE — TELEPHONE ENCOUNTER
Patient is request to get a refill on his Lotrel, looks like that medication was d/c a awhile back due to cough. Patient states that he has been taking this medication and has ran out. I have called the pharmacy and looks like a DR Jose Dorado was the last one to fill this medication for the patient back in March 2023.       I have called the patient and let him know the above, since he has ran out of medication he was told to take the Amlodipine ( Norvasc )  until his appt with Dr Ashlyn Arizmendi 6/9/23

## 2023-06-09 ENCOUNTER — OFFICE VISIT (OUTPATIENT)
Facility: CLINIC | Age: 53
End: 2023-06-09
Payer: MEDICAID

## 2023-06-09 ENCOUNTER — TELEPHONE (OUTPATIENT)
Facility: CLINIC | Age: 53
End: 2023-06-09

## 2023-06-09 VITALS
HEIGHT: 71 IN | BODY MASS INDEX: 31.5 KG/M2 | TEMPERATURE: 97.5 F | SYSTOLIC BLOOD PRESSURE: 147 MMHG | HEART RATE: 78 BPM | OXYGEN SATURATION: 95 % | WEIGHT: 225 LBS | RESPIRATION RATE: 16 BRPM | DIASTOLIC BLOOD PRESSURE: 83 MMHG

## 2023-06-09 DIAGNOSIS — E11.9 TYPE 2 DIABETES MELLITUS WITHOUT COMPLICATION, WITHOUT LONG-TERM CURRENT USE OF INSULIN (HCC): Primary | ICD-10-CM

## 2023-06-09 DIAGNOSIS — E78.00 HYPERCHOLESTEROLEMIA: ICD-10-CM

## 2023-06-09 DIAGNOSIS — N52.9 ERECTILE DYSFUNCTION, UNSPECIFIED ERECTILE DYSFUNCTION TYPE: ICD-10-CM

## 2023-06-09 DIAGNOSIS — I10 ESSENTIAL HYPERTENSION: ICD-10-CM

## 2023-06-09 PROCEDURE — 3077F SYST BP >= 140 MM HG: CPT | Performed by: STUDENT IN AN ORGANIZED HEALTH CARE EDUCATION/TRAINING PROGRAM

## 2023-06-09 PROCEDURE — 3079F DIAST BP 80-89 MM HG: CPT | Performed by: STUDENT IN AN ORGANIZED HEALTH CARE EDUCATION/TRAINING PROGRAM

## 2023-06-09 PROCEDURE — 99214 OFFICE O/P EST MOD 30 MIN: CPT | Performed by: STUDENT IN AN ORGANIZED HEALTH CARE EDUCATION/TRAINING PROGRAM

## 2023-06-09 PROCEDURE — 3046F HEMOGLOBIN A1C LEVEL >9.0%: CPT | Performed by: STUDENT IN AN ORGANIZED HEALTH CARE EDUCATION/TRAINING PROGRAM

## 2023-06-09 RX ORDER — SILDENAFIL 50 MG/1
50 TABLET, FILM COATED ORAL DAILY PRN
Qty: 10 TABLET | Refills: 0 | Status: SHIPPED | OUTPATIENT
Start: 2023-06-09

## 2023-06-09 RX ORDER — AMLODIPINE BESYLATE AND ATORVASTATIN CALCIUM 10; 40 MG/1; MG/1
1 TABLET, FILM COATED ORAL DAILY
Qty: 90 TABLET | Refills: 0 | Status: SHIPPED | OUTPATIENT
Start: 2023-06-09

## 2023-06-09 RX ORDER — PEN NEEDLE, DIABETIC 31 GX5/16"
NEEDLE, DISPOSABLE MISCELLANEOUS
COMMUNITY
Start: 2023-03-23 | End: 2023-06-09 | Stop reason: SDUPTHER

## 2023-06-09 RX ORDER — AMLODIPINE BESYLATE AND BENAZEPRIL HYDROCHLORIDE 10; 40 MG/1; MG/1
CAPSULE ORAL DAILY
COMMUNITY
Start: 2023-03-12 | End: 2023-06-09

## 2023-06-09 RX ORDER — SEMAGLUTIDE 0.68 MG/ML
0.5 INJECTION, SOLUTION SUBCUTANEOUS
COMMUNITY
Start: 2023-04-27 | End: 2023-07-26

## 2023-06-09 RX ORDER — PEN NEEDLE, DIABETIC 31 GX5/16"
NEEDLE, DISPOSABLE MISCELLANEOUS
Qty: 100 EACH | Refills: 2 | Status: SHIPPED | OUTPATIENT
Start: 2023-06-09

## 2023-06-09 RX ORDER — AMLODIPINE BESYLATE AND ATORVASTATIN CALCIUM 10; 40 MG/1; MG/1
1 TABLET, FILM COATED ORAL DAILY
Qty: 30 TABLET | Status: CANCELLED | OUTPATIENT
Start: 2023-06-09

## 2023-06-09 ASSESSMENT — ENCOUNTER SYMPTOMS
VOMITING: 0
ABDOMINAL PAIN: 0
COUGH: 0
SHORTNESS OF BREATH: 0
NAUSEA: 0
RHINORRHEA: 0

## 2023-06-09 NOTE — PROGRESS NOTES
Assessment/Plan:     Diagnoses and all orders for this visit:    Type 2 diabetes mellitus without complication, without long-term current use of insulin ([facility])  -     B-D ULTRAFINE III SHORT PEN 31G X 8 MM MISC; Use to take Lantus daily  -Chronic.  -Currently taking Lantus daily and Ozempic once per week. -Needs refill on insulin pen needles  -Due for repeat A1c and diabetic evaluation in 5 weeks. Patient will schedule follow-up    Essential hypertension  -     amLODIPine-atorvastatatin (CADUET) 10-40 MG per tablet; Take 1 tablet by mouth daily  -Chronic. BP slightly elevated in office today likely due to missing a medication dosage  -There has been significant confusion on what medications he is taking.  -Patient did not bring in his medication bottles with him today as requested. Therefore advised he needs to bring these in at his next appointment  -Patient states he is now taking the amlodipine-atorvastatin only. He is no longer taking the amlodipine-benazepril.  -Therefore advised him to consistently take this medication and follow-up in 5 weeks for repeat BP check    Hypercholesterolemia  -     amLODIPine-atorvastatatin (CADUET) 10-40 MG per tablet; Take 1 tablet by mouth daily  -Chronic. Uncertain of medication consistency  -Therefore he is now back on atorvastatin 40 mg daily  -Repeat lipid panel at next routine follow-up    Erectile dysfunction, unspecified erectile dysfunction type  -     sildenafil (VIAGRA) 50 MG tablet; Take 1 tablet by mouth daily as needed for Erectile Dysfunction Take 30 min to 1 hour prior to sexual activity  -Chronic. Likely secondary to history of diabetes and hypertension  -Work on maintaining good control of his DM and HTN  -Try Viagra as needed. Discussed medication risks and side effects  -Of note patient will be establishing with urology         Return in about 5 weeks (around 7/14/2023), or if symptoms worsen or fail to improve.      Discussed expected

## 2023-06-09 NOTE — TELEPHONE ENCOUNTER
Pt called in and states that he would be able to afford the sildenafil erectile dysfunction medication and would like it sent to   [id number] [facility] [id number]      Please advise

## 2023-06-09 NOTE — PROGRESS NOTES
Identified pt with two pt identifiers(name and ). Reviewed record in preparation for visit and have obtained necessary documentation. Chief Complaint   Patient presents with    Blood Pressure Check    Discuss Medications     Discuss Viagra         Health Maintenance Due   Topic    COVID-19 Vaccine (1)    Pneumococcal 0-64 years Vaccine (1 - PCV)    HIV screen     Diabetic retinal exam     Hepatitis C screen     Hepatitis B vaccine (1 of 3 - Risk 3-dose series)    DTaP/Tdap/Td vaccine (1 - Tdap)    Colorectal Cancer Screen     Shingles vaccine (1 of 2)       Coordination of Care Questionnaire:  :   1) Have you been to an emergency room, urgent care, or hospitalized since your last visit? If yes, where when, and reason for visit? 2. Have seen or consulted any other health care provider since your last visit? If yes, where when, and reason for visit?  no        Patient is accompanied by self I have received verbal consent from Gloria Crowder to discuss any/all medical information while they are present in the room.

## 2023-10-20 LAB — HBA1C MFR BLD HPLC: 14.9 %

## 2023-11-29 ENCOUNTER — TELEPHONE (OUTPATIENT)
Facility: CLINIC | Age: 53
End: 2023-11-29

## 2023-11-29 NOTE — TELEPHONE ENCOUNTER
Patient A1C on scanned results are significantly elevated. Patient has not been evaluated in 6 months. It appears he may have a new PCP. If so please advise him to discuss with his new PCP if not then he needs an appointment to discuss.

## 2025-04-09 ENCOUNTER — HOSPITAL ENCOUNTER (OUTPATIENT)
Facility: HOSPITAL | Age: 55
Discharge: HOME | End: 2025-04-12

## 2025-04-09 ENCOUNTER — CLINICAL DOCUMENTATION (OUTPATIENT)
Facility: HOSPITAL | Age: 55
End: 2025-04-09

## 2025-04-09 VITALS
WEIGHT: 236.4 LBS | RESPIRATION RATE: 18 BRPM | BODY MASS INDEX: 33.84 KG/M2 | SYSTOLIC BLOOD PRESSURE: 148 MMHG | DIASTOLIC BLOOD PRESSURE: 95 MMHG | HEIGHT: 70 IN | OXYGEN SATURATION: 94 % | HEART RATE: 87 BPM

## 2025-04-09 DIAGNOSIS — C61 PROSTATE CANCER (HCC): Primary | ICD-10-CM

## 2025-04-09 NOTE — PROGRESS NOTES
Cancer [place] at [facility]  [facility]    Radiation Oncology Consultation  Encounter Date: 04/09/25    Gunner Youssef  [id number]  1970     Diagnosis   C61: [id number], iPSA 4.4, GG2 (+8/14) unfavorable intermediate risk prostate cancer    AJCC Staging has been reviewed.  History of Present Illness   Mr. Youssef is a 54 y.o. male seen in consultation at the request of Dr. Nolasco to assess the role of radiation for his prostate cancer.    His oncologic history is detailed below:  01/08/2024: PSA 3.0  04/17/2024: PSA 4.4  08/28/2024: PSA 4.4  11/14/2024: MRI prostate showed a 31cc gland with PIRADS 3 lesions in the right posteromedial PZ and the left posteromedial PZ at the apex without EPE, SVI, pelvic lymphadenopathy, or aggressive osseous lesions.  02/24/2025: Fusion biopsy showed a cT1c examination with 31cc gland and GS 3+4=7 disease in the two targeted cores. Standard TRUS biopsy showed 3+4=7 disease in 5 cores, 3+3=6 in 1 core, total 8/14 positive cores with up to 80% core involvement. +PNI  03/12/2025: PSMA PET showed focal PyL binding in the right lateral PZ medially at the mid-gland extending to the base. No PyL binding in the seminal vesicles, lymph nodes, bones, or viscera. Note of possible artifactual PyL marked binding at the right femoral diaphysis without underlying CT abnormality. Recommended for plain film of the right femur.    Symptomatically, he has moderate urinary symptoms which he attributes to his diabetes. Has been on tamsulosin in the past to good effect but stopped using it, unclear why. From a performance status standpoint, he is able to complete his ADLs without issue. Mr. Youssef denies a history of inflammatory bowel disease, scleroderma or other collagen vascular diseases, pacemaker/AICD, or history of prior irradiation. He has not had a colonoscopy. He states he is very much leaning towards radiation out of concern for surgery-related

## 2025-04-09 NOTE — PROGRESS NOTES
Education was provided to the patient in the form of the following printed ANÍBAL booklet or booklets: Radiation Therapy for Prostate Cancer    Total time spent on education with patient: minutes: 5-10 minutes

## 2025-04-09 NOTE — PROGRESS NOTES
[facility]   Psychosocial Assessment      Location: Radiation Oncology at [facility]  Patient: Gunner Youssef (1970)    Reason for Assessment: Initial Assessment    Advance Care Planning: ACP conversation deferred at this time    Sources of Information: Patient, Staff, Medical Record    Mental Status: Alert, Oriented to Person, Oriented to Place, Oriented to Time, Oriented to Situation    Relationship Status: Single    Living Circumstances: Family/Significant Other in Household    Employment Status: Employed Full-time    Transportation Resources: Family/Friends, Insurance    Barriers to Learning: No Barriers Identified    Financial/Legal Concerns: Limited Income/Resources, Food Insecurity, Workplace: ability to keep working     Catholic/Spiritual/Existential: Conversation deferred    Support System: Strong Support: The patient has a reliable, consistent, and engaged network of family, friends, or community resources that effectively meet their emotional, social, and practical needs.  Patient's Primary Support Person/Group: friend, Iona, family, adult children    History of Mental Health / Substance Use Disorders: Yes  Reviewed patient's mental health/substance use history; identified the following:  to follow up for further assessment.    Coping with Illness: Challenges Coping with Serious Illness, Situational Depression, Situational Anxiety           Concerns/Barriers to Care: work, treatment, mental health     Narrative: Met with patient and his friend, Iona, to introduce  role and support. Patient lives with his mother and brother. He works for [employer] and reports his workplace is supportive. He is working on obtaining his licence. He and Iona have 4 adult children together ranging from ages 20-34 years old.     Patient shared he is having a hard time coping emotionally since finding out he has prostate cancer. Provided supportive listening as patient

## 2025-04-09 NOTE — PROGRESS NOTES
NCCN Distress Thermometer    Radiation Oncology at [facility]    Date Screening Completed: 4/9/25    Screening Declined:  [] Yes    Number that best describes how much distress you've experienced in the past week, including today?  0 [] - No distress 1 []      2 []      3 []      4 []       5 []       6 []      7 []      8 [x]      9 []       10 [] - Extreme distress    PROBLEM LIST  Have you had concerns about any of the items below in the past week, including today?      Physical Concerns Practical Concerns   [x] Pain [x] Taking care of myself    [x] Sleep [] Taking care of others    [x] Fatigue [] Safety   [] Tobacco use  [x] Work   [x] Substance use  [] School   [] Memory or concentration [] Housing/Utilities   [] Sexual health [x] Finances   [] Changes in eating  [] Insurance   [x] Loss or change of physical abilities  [] Transportation    []    Emotional Concerns [] Having enough food   [x] Worry or anxiety [] Access to medicine   [x] Sadness or depression [] Treatment decisions   [x] Loss of interest or enjoyment     [x] Grief or loss  Spiritual or Latter day Concerns   [x] Fear [] Sense of meaning or purpose   [x] Loneliness  [] Changes in myra or beliefs   [x] Anger [] Death, dying, or afterlife   [] Changes in appearance [] Conflict between beliefs and cancer treatments    [] Feelings of worthlessness or being a burden [] Relationship with the sacred    [] Ritual or dietary needs    Social Concerns     [] Relationship with spouse or partner     [] Relationship with children    [] Relationship with family members     [] Relationship with friends or coworkers     [] Communication with health care team     [] Ability to have children     [] Prejudice or discrimination        Other Concerns:

## 2025-06-12 ENCOUNTER — TRANSCRIBE ORDERS (OUTPATIENT)
Facility: HOSPITAL | Age: 55
End: 2025-06-12

## 2025-06-12 ENCOUNTER — TELEPHONE (OUTPATIENT)
Facility: HOSPITAL | Age: 55
End: 2025-06-12

## 2025-06-12 DIAGNOSIS — C61 MALIGNANT NEOPLASM OF PROSTATE (HCC): Primary | ICD-10-CM

## 2025-06-12 NOTE — TELEPHONE ENCOUNTER
This writer called the patient to inform him that he was set up for CT and MRI for treatment planning of radiation on 06/25/2025.  An email was sent to his [facility] address with details pertaining to instructions.  The patient verbalized understanding and was advised to call the office back if he needed further assistance.    2.03

## 2025-06-25 ENCOUNTER — HOSPITAL ENCOUNTER (OUTPATIENT)
Facility: HOSPITAL | Age: 55
Discharge: HOME | End: 2025-06-28
Attending: STUDENT IN AN ORGANIZED HEALTH CARE EDUCATION/TRAINING PROGRAM

## 2025-06-25 ENCOUNTER — HOSPITAL ENCOUNTER (OUTPATIENT)
Facility: HOSPITAL | Age: 55
Discharge: HOME | End: 2025-06-28
Attending: STUDENT IN AN ORGANIZED HEALTH CARE EDUCATION/TRAINING PROGRAM
Payer: MEDICAID

## 2025-06-25 DIAGNOSIS — C61 MALIGNANT NEOPLASM OF PROSTATE (HCC): ICD-10-CM

## 2025-06-25 PROCEDURE — 76498 UNLISTED MR PROCEDURE: CPT

## 2025-07-16 ENCOUNTER — HOSPITAL ENCOUNTER (OUTPATIENT)
Facility: HOSPITAL | Age: 55
Discharge: HOME | End: 2025-07-19
Attending: STUDENT IN AN ORGANIZED HEALTH CARE EDUCATION/TRAINING PROGRAM

## 2025-07-16 LAB
RAD ONC ARIA COURSE FIRST TREATMENT DATE: NORMAL
RAD ONC ARIA COURSE ID: NORMAL
RAD ONC ARIA COURSE INTENT: NORMAL
RAD ONC ARIA COURSE LAST TREATMENT DATE: NORMAL
RAD ONC ARIA COURSE SESSION NUMBER: 1
RAD ONC ARIA COURSE START DATE: NORMAL
RAD ONC ARIA COURSE TREATMENT ELAPSED DAYS: 0
RAD ONC ARIA PLAN FRACTIONS TREATED TO DATE: 1
RAD ONC ARIA PLAN ID: NORMAL
RAD ONC ARIA PLAN PRESCRIBED DOSE PER FRACTION: 7.25 GY
RAD ONC ARIA PLAN PRIMARY REFERENCE POINT: NORMAL
RAD ONC ARIA PLAN TOTAL FRACTIONS PRESCRIBED: 5
RAD ONC ARIA PLAN TOTAL PRESCRIBED DOSE: 3625 CGY
RAD ONC ARIA REFERENCE POINT DOSAGE GIVEN TO DATE: 7.25 GY
RAD ONC ARIA REFERENCE POINT DOSAGE GIVEN TO DATE: 8 GY
RAD ONC ARIA REFERENCE POINT DOSAGE GIVEN TO DATE: 8.27 GY
RAD ONC ARIA REFERENCE POINT ID: NORMAL
RAD ONC ARIA REFERENCE POINT SESSION DOSAGE GIVEN: 7.25 GY
RAD ONC ARIA REFERENCE POINT SESSION DOSAGE GIVEN: 8 GY
RAD ONC ARIA REFERENCE POINT SESSION DOSAGE GIVEN: 8.27 GY

## 2025-07-18 ENCOUNTER — HOSPITAL ENCOUNTER (OUTPATIENT)
Facility: HOSPITAL | Age: 55
Discharge: HOME | End: 2025-07-21
Attending: STUDENT IN AN ORGANIZED HEALTH CARE EDUCATION/TRAINING PROGRAM

## 2025-07-18 LAB
RAD ONC ARIA COURSE FIRST TREATMENT DATE: NORMAL
RAD ONC ARIA COURSE ID: NORMAL
RAD ONC ARIA COURSE INTENT: NORMAL
RAD ONC ARIA COURSE LAST TREATMENT DATE: NORMAL
RAD ONC ARIA COURSE SESSION NUMBER: 2
RAD ONC ARIA COURSE START DATE: NORMAL
RAD ONC ARIA COURSE TREATMENT ELAPSED DAYS: 2
RAD ONC ARIA PLAN FRACTIONS TREATED TO DATE: 2
RAD ONC ARIA PLAN ID: NORMAL
RAD ONC ARIA PLAN PRESCRIBED DOSE PER FRACTION: 7.25 GY
RAD ONC ARIA PLAN PRIMARY REFERENCE POINT: NORMAL
RAD ONC ARIA PLAN TOTAL FRACTIONS PRESCRIBED: 5
RAD ONC ARIA PLAN TOTAL PRESCRIBED DOSE: 3625 CGY
RAD ONC ARIA REFERENCE POINT DOSAGE GIVEN TO DATE: 14.5 GY
RAD ONC ARIA REFERENCE POINT DOSAGE GIVEN TO DATE: 16 GY
RAD ONC ARIA REFERENCE POINT DOSAGE GIVEN TO DATE: 16.55 GY
RAD ONC ARIA REFERENCE POINT ID: NORMAL
RAD ONC ARIA REFERENCE POINT SESSION DOSAGE GIVEN: 7.25 GY
RAD ONC ARIA REFERENCE POINT SESSION DOSAGE GIVEN: 8 GY
RAD ONC ARIA REFERENCE POINT SESSION DOSAGE GIVEN: 8.27 GY

## 2025-07-21 ENCOUNTER — HOSPITAL ENCOUNTER (OUTPATIENT)
Facility: HOSPITAL | Age: 55
Discharge: HOME | End: 2025-07-24
Attending: STUDENT IN AN ORGANIZED HEALTH CARE EDUCATION/TRAINING PROGRAM

## 2025-07-21 DIAGNOSIS — C61 PROSTATE CANCER (HCC): Primary | ICD-10-CM

## 2025-07-21 LAB
RAD ONC ARIA COURSE FIRST TREATMENT DATE: NORMAL
RAD ONC ARIA COURSE ID: NORMAL
RAD ONC ARIA COURSE INTENT: NORMAL
RAD ONC ARIA COURSE LAST TREATMENT DATE: NORMAL
RAD ONC ARIA COURSE SESSION NUMBER: 3
RAD ONC ARIA COURSE START DATE: NORMAL
RAD ONC ARIA COURSE TREATMENT ELAPSED DAYS: 5
RAD ONC ARIA PLAN FRACTIONS TREATED TO DATE: 3
RAD ONC ARIA PLAN ID: NORMAL
RAD ONC ARIA PLAN PRESCRIBED DOSE PER FRACTION: 7.25 GY
RAD ONC ARIA PLAN PRIMARY REFERENCE POINT: NORMAL
RAD ONC ARIA PLAN TOTAL FRACTIONS PRESCRIBED: 5
RAD ONC ARIA PLAN TOTAL PRESCRIBED DOSE: 3625 CGY
RAD ONC ARIA REFERENCE POINT DOSAGE GIVEN TO DATE: 21.75 GY
RAD ONC ARIA REFERENCE POINT DOSAGE GIVEN TO DATE: 24 GY
RAD ONC ARIA REFERENCE POINT DOSAGE GIVEN TO DATE: 24.82 GY
RAD ONC ARIA REFERENCE POINT ID: NORMAL
RAD ONC ARIA REFERENCE POINT SESSION DOSAGE GIVEN: 7.25 GY
RAD ONC ARIA REFERENCE POINT SESSION DOSAGE GIVEN: 8 GY
RAD ONC ARIA REFERENCE POINT SESSION DOSAGE GIVEN: 8.27 GY

## 2025-07-21 ASSESSMENT — PATIENT HEALTH QUESTIONNAIRE - PHQ9
2. FEELING DOWN, DEPRESSED OR HOPELESS: SEVERAL DAYS
1. LITTLE INTEREST OR PLEASURE IN DOING THINGS: SEVERAL DAYS
SUM OF ALL RESPONSES TO PHQ QUESTIONS 1-9: 2

## 2025-07-21 ASSESSMENT — PAIN SCALES - GENERAL: PAINLEVEL_OUTOF10: 0

## 2025-07-21 NOTE — PROGRESS NOTES
Cancer [place] at [facility]  [facility]    Radiation Oncology Weekly Progress Note  Encounter Date: 07/21/25    Gunner Youssef  [id number]  1970     Diagnosis   C61: [id number], iPSA 4.4, GG2 (+8/14) unfavorable intermediate risk prostate cancer     AJCC Staging has been reviewed.  Oncologic History   Mr. Youssef is a 55 y.o. male initially seen to assess the overall management and role of radiation for his above diagnosis.    01/08/2024: PSA 3.0  04/17/2024: PSA 4.4  08/28/2024: PSA 4.4  11/14/2024: MRI prostate showed a 31cc gland with PIRADS 3 lesions in the right posteromedial PZ and the left posteromedial PZ at the apex without EPE, SVI, pelvic lymphadenopathy, or aggressive osseous lesions.  02/24/2025: Fusion biopsy showed a cT1c examination with 31cc gland and GS 3+4=7 disease in the two targeted cores. Standard TRUS biopsy showed 3+4=7 disease in 5 cores, 3+3=6 in 1 core, total 8/14 positive cores with up to 80% core involvement. +PNI  03/12/2025: PSMA PET showed focal PyL binding in the right lateral PZ medially at the mid-gland extending to the base. No PyL binding in the seminal vesicles, lymph nodes, bones, or viscera. Note of possible artifactual PyL marked binding at the right femoral diaphysis without underlying CT abnormality. Recommended for plain film of the right femur.    He was recommended a course of radiation directed at the prostate/SV with 6 months of ADT (camcevi administered 5/22/25)    Interval History   Mr. Youssef was seen today for his weekly on-treatment evaluation    07/21/2025: at fraction 3, first OTV. Tolerating treatment without issue. Reviewed treatment expectations and logistics.Will have him return in 3 months with PSA/T.    Treatment Details   Treatment Site: Prostate, SV  Treatment Technique: IMRT/VMAT  Treatment Site Dose/Fx (cGy) #Fx Delivered Dose (cGy) Total Planned Dose (cGy) Start Date End Date   Prostate,  3/5

## 2025-07-23 ENCOUNTER — HOSPITAL ENCOUNTER (OUTPATIENT)
Facility: HOSPITAL | Age: 55
Discharge: HOME | End: 2025-07-26
Attending: STUDENT IN AN ORGANIZED HEALTH CARE EDUCATION/TRAINING PROGRAM

## 2025-07-23 LAB
RAD ONC ARIA COURSE FIRST TREATMENT DATE: NORMAL
RAD ONC ARIA COURSE ID: NORMAL
RAD ONC ARIA COURSE INTENT: NORMAL
RAD ONC ARIA COURSE LAST TREATMENT DATE: NORMAL
RAD ONC ARIA COURSE SESSION NUMBER: 4
RAD ONC ARIA COURSE START DATE: NORMAL
RAD ONC ARIA COURSE TREATMENT ELAPSED DAYS: 7
RAD ONC ARIA PLAN FRACTIONS TREATED TO DATE: 4
RAD ONC ARIA PLAN ID: NORMAL
RAD ONC ARIA PLAN PRESCRIBED DOSE PER FRACTION: 7.25 GY
RAD ONC ARIA PLAN PRIMARY REFERENCE POINT: NORMAL
RAD ONC ARIA PLAN TOTAL FRACTIONS PRESCRIBED: 5
RAD ONC ARIA PLAN TOTAL PRESCRIBED DOSE: 3625 CGY
RAD ONC ARIA REFERENCE POINT DOSAGE GIVEN TO DATE: 29 GY
RAD ONC ARIA REFERENCE POINT DOSAGE GIVEN TO DATE: 32 GY
RAD ONC ARIA REFERENCE POINT DOSAGE GIVEN TO DATE: 33.09 GY
RAD ONC ARIA REFERENCE POINT ID: NORMAL
RAD ONC ARIA REFERENCE POINT SESSION DOSAGE GIVEN: 7.25 GY
RAD ONC ARIA REFERENCE POINT SESSION DOSAGE GIVEN: 8 GY
RAD ONC ARIA REFERENCE POINT SESSION DOSAGE GIVEN: 8.27 GY

## 2025-07-25 ENCOUNTER — CLINICAL DOCUMENTATION (OUTPATIENT)
Facility: HOSPITAL | Age: 55
End: 2025-07-25

## 2025-07-25 ENCOUNTER — HOSPITAL ENCOUNTER (OUTPATIENT)
Facility: HOSPITAL | Age: 55
Discharge: HOME | End: 2025-07-28
Attending: STUDENT IN AN ORGANIZED HEALTH CARE EDUCATION/TRAINING PROGRAM

## 2025-07-25 LAB
RAD ONC ARIA COURSE FIRST TREATMENT DATE: NORMAL
RAD ONC ARIA COURSE ID: NORMAL
RAD ONC ARIA COURSE INTENT: NORMAL
RAD ONC ARIA COURSE LAST TREATMENT DATE: NORMAL
RAD ONC ARIA COURSE SESSION NUMBER: 5
RAD ONC ARIA COURSE START DATE: NORMAL
RAD ONC ARIA COURSE TREATMENT ELAPSED DAYS: 9
RAD ONC ARIA PLAN FRACTIONS TREATED TO DATE: 5
RAD ONC ARIA PLAN ID: NORMAL
RAD ONC ARIA PLAN PRESCRIBED DOSE PER FRACTION: 7.25 GY
RAD ONC ARIA PLAN PRIMARY REFERENCE POINT: NORMAL
RAD ONC ARIA PLAN TOTAL FRACTIONS PRESCRIBED: 5
RAD ONC ARIA PLAN TOTAL PRESCRIBED DOSE: 3625 CGY
RAD ONC ARIA REFERENCE POINT DOSAGE GIVEN TO DATE: 36.25 GY
RAD ONC ARIA REFERENCE POINT DOSAGE GIVEN TO DATE: 40 GY
RAD ONC ARIA REFERENCE POINT DOSAGE GIVEN TO DATE: 41.37 GY
RAD ONC ARIA REFERENCE POINT ID: NORMAL
RAD ONC ARIA REFERENCE POINT SESSION DOSAGE GIVEN: 7.25 GY
RAD ONC ARIA REFERENCE POINT SESSION DOSAGE GIVEN: 8 GY
RAD ONC ARIA REFERENCE POINT SESSION DOSAGE GIVEN: 8.27 GY

## 2025-07-25 NOTE — PROGRESS NOTES
Cancer [place] at [facility]  [facility]    Radiation Oncology End of Treatment Summary    Gunner Youssef  [id number]  1970     Diagnosis   C61: [id number], iPSA 4.4, GG2 (+8/14) unfavorable intermediate risk prostate cancer      AJCC Staging has been reviewed.  Oncologic History   Mr. Youssef is a 55 y.o. male initially seen in consultation to assess the overall management and role of radiation for his above diagnosis.    01/08/2024: PSA 3.0  04/17/2024: PSA 4.4  08/28/2024: PSA 4.4  11/14/2024: MRI prostate showed a 31cc gland with PIRADS 3 lesions in the right posteromedial PZ and the left posteromedial PZ at the apex without EPE, SVI, pelvic lymphadenopathy, or aggressive osseous lesions.  02/24/2025: Fusion biopsy showed a cT1c examination with 31cc gland and GS 3+4=7 disease in the two targeted cores. Standard TRUS biopsy showed 3+4=7 disease in 5 cores, 3+3=6 in 1 core, total 8/14 positive cores with up to 80% core involvement. +PNI  03/12/2025: PSMA PET showed focal PyL binding in the right lateral PZ medially at the mid-gland extending to the base. No PyL binding in the seminal vesicles, lymph nodes, bones, or viscera. Note of possible artifactual PyL marked binding at the right femoral diaphysis without underlying CT abnormality. Recommended for plain film of the right femur.     He was recommended a course of radiation directed at the prostate/SV with 6 months of ADT (camcevi administered 5/22/25)    Treatment Details:   Treatment Site: Prostate, SV  Treatment Technique: IMRT/VMAT  Treatment Site Dose/Fx (cGy) #Fx Delivered Dose (cGy) Start Date End Date Elapsed Days   Prostate,  5 3625 07/16/2025 07/25/2025 10     Concurrent Therapy: Concurrent ADT    Under-treatment Course Summary   He completed radiation without any unexpected side effects to treatment.    Follow Up Plan   - Follow up in 3 months with PSA/T    Thank you for the opportunity to